# Patient Record
Sex: MALE | Race: WHITE | NOT HISPANIC OR LATINO | URBAN - METROPOLITAN AREA
[De-identification: names, ages, dates, MRNs, and addresses within clinical notes are randomized per-mention and may not be internally consistent; named-entity substitution may affect disease eponyms.]

---

## 2018-03-14 ENCOUNTER — OFFICE (OUTPATIENT)
Dept: URBAN - METROPOLITAN AREA CLINIC 45 | Facility: CLINIC | Age: 68
End: 2018-03-14

## 2018-03-14 VITALS
SYSTOLIC BLOOD PRESSURE: 142 MMHG | HEART RATE: 80 BPM | DIASTOLIC BLOOD PRESSURE: 78 MMHG | HEIGHT: 65 IN | WEIGHT: 197 LBS

## 2018-03-14 DIAGNOSIS — E66.9 OBESITY: ICD-10-CM

## 2018-03-14 DIAGNOSIS — I85.00: ICD-10-CM

## 2018-03-14 DIAGNOSIS — E11.9 DIABETES MELLITUS - NON ID, UNSPECIFIED: ICD-10-CM

## 2018-03-14 DIAGNOSIS — I67.89: ICD-10-CM

## 2018-03-14 PROCEDURE — 99215 OFFICE O/P EST HI 40 MIN: CPT | Performed by: INTERNAL MEDICINE

## 2018-03-14 RX ORDER — PROPRANOLOL HYDROCHLORIDE 40 MG/1
TABLET ORAL
Qty: 60 | Status: ACTIVE
Start: 2015-01-17 | End: 2018-09-06

## 2018-03-14 NOTE — SERVICEHPINOTES
The patient returns for follow-up of chronic liver disease related to prior history of having alcohol abuse and probable MANN. The patient has history of decompensated cirrhosis with signs of portal hypertension and hepatic encephalopathy.  He has not had ascites yet, although had evidence of grade 2 non-bleeding esophageal varices on initial upper endoscopy done in 2015.  Since last visit the patient has remained stable. The patient's liver disease has been previously complicated by hepatic encephalopathy, that appears to be under good control by twice daily use of Xifaxan. The patient complains of mild chronic fatigue, otherwise is pretty asymptomatic. Liver maintenance issues that need to be updated include discussion about continuous maintenance of soft low salt diet and avoidance of alcohol–hepatotoxic medication. We will continue screening for hepatocellular carcinoma with imaging study of the liver and alpha-fetoprotein levels every 6 month. The patient is also due for endoscopic evaluation for varices and primary prevention of variceal bleeding.

## 2018-03-15 LAB
ALPHA FETOPROTEIN, TUMOR MARKER: 2.1 NG/ML (ref ?–6.1)
AMMONIA (P): 70 UMOL/L — HIGH (ref ?–47)
CBC (INCLUDES DIFF/PLT): ABSOLUTE BAND NEUTROPHILS: NORMAL CELLS/UL
CBC (INCLUDES DIFF/PLT): ABSOLUTE BASOPHILS: 28 CELLS/UL (ref 0–200)
CBC (INCLUDES DIFF/PLT): ABSOLUTE BLASTS: NORMAL CELLS/UL
CBC (INCLUDES DIFF/PLT): ABSOLUTE EOSINOPHILS: 61 CELLS/UL (ref 15–500)
CBC (INCLUDES DIFF/PLT): ABSOLUTE LYMPHOCYTES: 1227 CELLS/UL (ref 850–3900)
CBC (INCLUDES DIFF/PLT): ABSOLUTE METAMYELOCYTES: NORMAL CELLS/UL
CBC (INCLUDES DIFF/PLT): ABSOLUTE MONOCYTES: 465 CELLS/UL (ref 200–950)
CBC (INCLUDES DIFF/PLT): ABSOLUTE MYELOCYTES: NORMAL CELLS/UL
CBC (INCLUDES DIFF/PLT): ABSOLUTE NEUTROPHILS: 2919 CELLS/UL (ref 1500–7800)
CBC (INCLUDES DIFF/PLT): ABSOLUTE NUCLEATED RBC: 0 CELLS/UL (ref 0–?)
CBC (INCLUDES DIFF/PLT): ABSOLUTE PROMYELOCYTES: NORMAL CELLS/UL
CBC (INCLUDES DIFF/PLT): BAND NEUTROPHILS: NORMAL %
CBC (INCLUDES DIFF/PLT): BASOPHILS: 0.6 %
CBC (INCLUDES DIFF/PLT): BLASTS: NORMAL %
CBC (INCLUDES DIFF/PLT): COMMENT(S): NORMAL
CBC (INCLUDES DIFF/PLT): EOSINOPHILS: 1.3 %
CBC (INCLUDES DIFF/PLT): HEMATOCRIT: 37.5 % — LOW (ref 38.5–50)
CBC (INCLUDES DIFF/PLT): HEMOGLOBIN: 11.5 G/DL — LOW (ref 13.2–17.1)
CBC (INCLUDES DIFF/PLT): LYMPHOCYTES: 26.1 %
CBC (INCLUDES DIFF/PLT): MCH: 22 PG — LOW (ref 27–33)
CBC (INCLUDES DIFF/PLT): MCHC: 30.7 G/DL — LOW (ref 32–36)
CBC (INCLUDES DIFF/PLT): MCV: 71.7 FL — LOW (ref 80–100)
CBC (INCLUDES DIFF/PLT): METAMYELOCYTES: NORMAL %
CBC (INCLUDES DIFF/PLT): MONOCYTES: 9.9 %
CBC (INCLUDES DIFF/PLT): MPV: (no result) FL
CBC (INCLUDES DIFF/PLT): MYELOCYTES: NORMAL %
CBC (INCLUDES DIFF/PLT): NEUTROPHILS: 62.1 %
CBC (INCLUDES DIFF/PLT): NUCLEATED RBC: NORMAL /100 WBC
CBC (INCLUDES DIFF/PLT): PLATELET COUNT: 133 THOUSAND/UL — LOW (ref 140–400)
CBC (INCLUDES DIFF/PLT): PROMYELOCYTES: NORMAL %
CBC (INCLUDES DIFF/PLT): RDW: 17.5 % — HIGH (ref 11–15)
CBC (INCLUDES DIFF/PLT): REACTIVE LYMPHOCYTES: NORMAL %
CBC (INCLUDES DIFF/PLT): RED BLOOD CELL COUNT: 5.23 MILLION/UL (ref 4.2–5.8)
CBC (INCLUDES DIFF/PLT): WHITE BLOOD CELL COUNT: 4.7 THOUSAND/UL (ref 3.8–10.8)
COMPREHENSIVE METABOLIC PANEL: ALBUMIN/GLOBULIN RATIO: 1.3 (CALC) (ref 1–2.5)
COMPREHENSIVE METABOLIC PANEL: ALBUMIN: 4.5 G/DL (ref 3.6–5.1)
COMPREHENSIVE METABOLIC PANEL: ALKALINE PHOSPHATASE: 52 U/L (ref 40–115)
COMPREHENSIVE METABOLIC PANEL: ALT: 46 U/L (ref 9–46)
COMPREHENSIVE METABOLIC PANEL: AST: 39 U/L — HIGH (ref 10–35)
COMPREHENSIVE METABOLIC PANEL: BILIRUBIN, TOTAL: 0.7 MG/DL (ref 0.2–1.2)
COMPREHENSIVE METABOLIC PANEL: BUN/CREATININE RATIO: (no result) (CALC)
COMPREHENSIVE METABOLIC PANEL: CALCIUM: 9.5 MG/DL (ref 8.6–10.3)
COMPREHENSIVE METABOLIC PANEL: CARBON DIOXIDE: 25 MMOL/L (ref 20–31)
COMPREHENSIVE METABOLIC PANEL: CHLORIDE: 101 MMOL/L (ref 98–110)
COMPREHENSIVE METABOLIC PANEL: CREATININE: 1.16 MG/DL (ref 0.7–1.25)
COMPREHENSIVE METABOLIC PANEL: EGFR AFRICAN AMERICAN: 74 ML/MIN/1.73M2 (ref 60–?)
COMPREHENSIVE METABOLIC PANEL: EGFR NON-AFR. AMERICAN: 64 ML/MIN/1.73M2 (ref 60–?)
COMPREHENSIVE METABOLIC PANEL: GLOBULIN: 3.6 G/DL (CALC) (ref 1.9–3.7)
COMPREHENSIVE METABOLIC PANEL: GLUCOSE: 284 MG/DL — HIGH (ref 65–139)
COMPREHENSIVE METABOLIC PANEL: POTASSIUM: 4.2 MMOL/L (ref 3.5–5.3)
COMPREHENSIVE METABOLIC PANEL: PROTEIN, TOTAL: 8.1 G/DL (ref 6.1–8.1)
COMPREHENSIVE METABOLIC PANEL: SODIUM: 134 MMOL/L — LOW (ref 135–146)
COMPREHENSIVE METABOLIC PANEL: UREA NITROGEN (BUN): 15 MG/DL (ref 7–25)
HEPATITIS A AB, TOTAL: REACTIVE
HEPATITIS B SURFACE ANTIBODY QL: NORMAL
HEPATITIS PANEL, ACUTE W/REFLEX TO CONFIRMATION: CONFIRMATION: NORMAL
HEPATITIS PANEL, ACUTE W/REFLEX TO CONFIRMATION: HEPATITIS A IGM: NORMAL
HEPATITIS PANEL, ACUTE W/REFLEX TO CONFIRMATION: HEPATITIS B CORE ANTIBODY (IGM): NORMAL
HEPATITIS PANEL, ACUTE W/REFLEX TO CONFIRMATION: HEPATITIS B SURFACE ANTIGEN: NORMAL
HEPATITIS PANEL, ACUTE W/REFLEX TO CONFIRMATION: HEPATITIS C ANTIBODY: NORMAL
HEPATITIS PANEL, ACUTE W/REFLEX TO CONFIRMATION: SIGNAL TO CUT-OFF: 0.02 (ref ?–1)
PROTHROMBIN TIME-INR: INR: 1.1
PROTHROMBIN TIME-INR: PT: 12 SEC — HIGH (ref 9–11.5)

## 2018-03-20 LAB — MR ABDOMEN W WO: PDF REPORT: (no result)

## 2018-09-05 ENCOUNTER — OFFICE (OUTPATIENT)
Dept: URBAN - METROPOLITAN AREA CLINIC 45 | Facility: CLINIC | Age: 68
End: 2018-09-05

## 2018-09-05 VITALS
HEIGHT: 65 IN | WEIGHT: 187 LBS | DIASTOLIC BLOOD PRESSURE: 75 MMHG | HEART RATE: 86 BPM | SYSTOLIC BLOOD PRESSURE: 130 MMHG

## 2018-09-05 DIAGNOSIS — K59.1 DIARRHEA, FUNCTIONAL: ICD-10-CM

## 2018-09-05 DIAGNOSIS — R07.9 CHEST PAIN: ICD-10-CM

## 2018-09-05 DIAGNOSIS — K76.6 PORTAL HYPERTENSION: ICD-10-CM

## 2018-09-05 DIAGNOSIS — I85.00: ICD-10-CM

## 2018-09-05 DIAGNOSIS — I67.89: ICD-10-CM

## 2018-09-05 PROCEDURE — 99215 OFFICE O/P EST HI 40 MIN: CPT | Performed by: INTERNAL MEDICINE

## 2018-09-05 NOTE — SERVICEHPINOTES
The patient returns for another 6-month follow-up of chronic liver disease related to prior history of having alcohol abuse and probable MANN. She reports no significant change since last visit, denies worsening of encephalopathy and memory problems. The patient has history of cirrhosis with signs of portal hypertension and hepatic encephalopathy. He has not had ascites yet, although had evidence of grade 2 non-bleeding esophageal varices on initial upper endoscopy done in 2015. The patient complains of mild chronic fatigue with difficulties walking, intermittent chest pains. He is reportedly undergoing cardiac rehabilitation exercises every Friday. Liver maintenance issues that need to be updated include discussion about continuous maintenance of soft low salt diet and avoidance of alcohol–hepatotoxic medication. We will continue screening for hepatocellular carcinoma with imaging study of the liver and alpha-fetoprotein levels every 6 month. The patient is also due for endoscopic evaluation for varices and primary prevention of variceal bleeding.  The patient reports compliance with all his medication, including witnessed twice daily propranolol, however, his heart rate continues to be in mid 80s with systolic blood pressure around 140.

## 2018-09-06 LAB
ALPHA FETOPROTEIN, TUMOR MARKER: 1.6 NG/ML (ref ?–6.1)
AMMONIA (P): 49 UMOL/L — HIGH (ref ?–47)
CBC (H/H, RBC, INDICES, WBC, PLT): HEMATOCRIT: 29.8 % — LOW (ref 38.5–50)
CBC (H/H, RBC, INDICES, WBC, PLT): HEMOGLOBIN: 8.5 G/DL — LOW (ref 13.2–17.1)
CBC (H/H, RBC, INDICES, WBC, PLT): MCH: 18.6 PG — LOW (ref 27–33)
CBC (H/H, RBC, INDICES, WBC, PLT): MCHC: 28.5 G/DL — LOW (ref 32–36)
CBC (H/H, RBC, INDICES, WBC, PLT): MCV: 65.1 FL — LOW (ref 80–100)
CBC (H/H, RBC, INDICES, WBC, PLT): MPV: 10.6 FL (ref 7.5–12.5)
CBC (H/H, RBC, INDICES, WBC, PLT): PLATELET COUNT: 171 THOUSAND/UL (ref 140–400)
CBC (H/H, RBC, INDICES, WBC, PLT): RDW: 18.5 % — HIGH (ref 11–15)
CBC (H/H, RBC, INDICES, WBC, PLT): RED BLOOD CELL COUNT: 4.58 MILLION/UL (ref 4.2–5.8)
CBC (H/H, RBC, INDICES, WBC, PLT): WHITE BLOOD CELL COUNT: 5.8 THOUSAND/UL (ref 3.8–10.8)
COMPREHENSIVE METABOLIC PANEL: ALBUMIN/GLOBULIN RATIO: 1.3 (CALC) (ref 1–2.5)
COMPREHENSIVE METABOLIC PANEL: ALBUMIN: 4.5 G/DL (ref 3.6–5.1)
COMPREHENSIVE METABOLIC PANEL: ALKALINE PHOSPHATASE: 40 U/L (ref 40–115)
COMPREHENSIVE METABOLIC PANEL: ALT: 25 U/L (ref 9–46)
COMPREHENSIVE METABOLIC PANEL: AST: 28 U/L (ref 10–35)
COMPREHENSIVE METABOLIC PANEL: BILIRUBIN, TOTAL: 0.6 MG/DL (ref 0.2–1.2)
COMPREHENSIVE METABOLIC PANEL: BUN/CREATININE RATIO: 17 (CALC) (ref 6–22)
COMPREHENSIVE METABOLIC PANEL: CALCIUM: 9.6 MG/DL (ref 8.6–10.3)
COMPREHENSIVE METABOLIC PANEL: CARBON DIOXIDE: 20 MMOL/L (ref 20–32)
COMPREHENSIVE METABOLIC PANEL: CHLORIDE: 104 MMOL/L (ref 98–110)
COMPREHENSIVE METABOLIC PANEL: CREATININE: 1.34 MG/DL — HIGH (ref 0.7–1.25)
COMPREHENSIVE METABOLIC PANEL: EGFR AFRICAN AMERICAN: 62 ML/MIN/1.73M2 (ref 60–?)
COMPREHENSIVE METABOLIC PANEL: EGFR NON-AFR. AMERICAN: 54 ML/MIN/1.73M2 — LOW (ref 60–?)
COMPREHENSIVE METABOLIC PANEL: GLOBULIN: 3.4 G/DL (CALC) (ref 1.9–3.7)
COMPREHENSIVE METABOLIC PANEL: GLUCOSE: 160 MG/DL — HIGH (ref 65–139)
COMPREHENSIVE METABOLIC PANEL: POTASSIUM: 4 MMOL/L (ref 3.5–5.3)
COMPREHENSIVE METABOLIC PANEL: PROTEIN, TOTAL: 7.9 G/DL (ref 6.1–8.1)
COMPREHENSIVE METABOLIC PANEL: SODIUM: 136 MMOL/L (ref 135–146)
COMPREHENSIVE METABOLIC PANEL: UREA NITROGEN (BUN): 23 MG/DL (ref 7–25)

## 2018-09-06 RX ORDER — PROPRANOLOL HYDROCHLORIDE 40 MG/1
TABLET ORAL
Qty: 60 | Status: ACTIVE
Start: 2015-01-17 | End: 2018-09-06

## 2019-05-01 ENCOUNTER — OFFICE (OUTPATIENT)
Dept: URBAN - METROPOLITAN AREA CLINIC 45 | Facility: CLINIC | Age: 69
End: 2019-05-01

## 2019-05-01 VITALS
HEART RATE: 79 BPM | SYSTOLIC BLOOD PRESSURE: 151 MMHG | WEIGHT: 191 LBS | DIASTOLIC BLOOD PRESSURE: 88 MMHG | HEIGHT: 65 IN

## 2019-05-01 DIAGNOSIS — K70.30 CIRRHOSIS, ALCOHOLIC: ICD-10-CM

## 2019-05-01 DIAGNOSIS — I67.89: ICD-10-CM

## 2019-05-01 DIAGNOSIS — D64.9 ANEMIA UNSPECIFIED: ICD-10-CM

## 2019-05-01 DIAGNOSIS — I85.00: ICD-10-CM

## 2019-05-01 DIAGNOSIS — E11.9 DIABETES MELLITUS - NON ID, UNSPECIFIED: ICD-10-CM

## 2019-05-01 PROCEDURE — 99215 OFFICE O/P EST HI 40 MIN: CPT | Performed by: INTERNAL MEDICINE

## 2019-05-01 NOTE — SERVICEHPINOTES
The patient returns for routine 6-month follow-up of chronic liver disease/compensated cirrhosis. Patient reports increase in abdominal girth without abdominal pain or discomfort. He also denies weight gain or edema, reports no significant clinical change since last visit. He denies worsening of encephalopathy while taking twice-daily Xifaxan. The patient has known cirrhosis with portal hypertension and mild hepatic encephalopathy. He has not had ascites, although grade 2 non-bleeding esophageal varices were noted on initial upper endoscopy done by me in 2015. The patient complains of mild chronic fatigue with difficulties walking. Liver maintenance issues that need to be updated include continuous maintenance of soft low salt diet, avoidance of alcohol and hepatotoxic medication. We will continue screening for hepatocellular carcinoma with imaging study of the liver and alpha-fetoprotein levels every 6 month. The patient is also due for endoscopic evaluation for varices and primary prevention of variceal bleeding if cleared by cardiologist.

## 2019-05-02 LAB
ALPHA FETOPROTEIN, TUMOR MARKER: 1.8 NG/ML (ref ?–6.1)
CBC (H/H, RBC, INDICES, WBC, PLT): HEMATOCRIT: 36.5 % — LOW (ref 38.5–50)
CBC (H/H, RBC, INDICES, WBC, PLT): HEMOGLOBIN: 11.7 G/DL — LOW (ref 13.2–17.1)
CBC (H/H, RBC, INDICES, WBC, PLT): MCH: 25 PG — LOW (ref 27–33)
CBC (H/H, RBC, INDICES, WBC, PLT): MCHC: 32.1 G/DL (ref 32–36)
CBC (H/H, RBC, INDICES, WBC, PLT): MCV: 78 FL — LOW (ref 80–100)
CBC (H/H, RBC, INDICES, WBC, PLT): MPV: 12.2 FL (ref 7.5–12.5)
CBC (H/H, RBC, INDICES, WBC, PLT): PLATELET COUNT: 141 THOUSAND/UL (ref 140–400)
CBC (H/H, RBC, INDICES, WBC, PLT): RDW: 15.6 % — HIGH (ref 11–15)
CBC (H/H, RBC, INDICES, WBC, PLT): RED BLOOD CELL COUNT: 4.68 MILLION/UL (ref 4.2–5.8)
CBC (H/H, RBC, INDICES, WBC, PLT): WHITE BLOOD CELL COUNT: 5.5 THOUSAND/UL (ref 3.8–10.8)
COMPREHENSIVE METABOLIC PANEL: ALBUMIN/GLOBULIN RATIO: 1.3 (CALC) (ref 1–2.5)
COMPREHENSIVE METABOLIC PANEL: ALBUMIN: 4.5 G/DL (ref 3.6–5.1)
COMPREHENSIVE METABOLIC PANEL: ALKALINE PHOSPHATASE: 49 U/L (ref 40–115)
COMPREHENSIVE METABOLIC PANEL: ALT: 33 U/L (ref 9–46)
COMPREHENSIVE METABOLIC PANEL: AST: 33 U/L (ref 10–35)
COMPREHENSIVE METABOLIC PANEL: BILIRUBIN, TOTAL: 0.7 MG/DL (ref 0.2–1.2)
COMPREHENSIVE METABOLIC PANEL: BUN/CREATININE RATIO: (no result) (CALC)
COMPREHENSIVE METABOLIC PANEL: CALCIUM: 9.7 MG/DL (ref 8.6–10.3)
COMPREHENSIVE METABOLIC PANEL: CARBON DIOXIDE: 23 MMOL/L (ref 20–32)
COMPREHENSIVE METABOLIC PANEL: CHLORIDE: 100 MMOL/L (ref 98–110)
COMPREHENSIVE METABOLIC PANEL: CREATININE: 1.07 MG/DL (ref 0.7–1.18)
COMPREHENSIVE METABOLIC PANEL: EGFR AFRICAN AMERICAN: 81 ML/MIN/1.73M2 (ref 60–?)
COMPREHENSIVE METABOLIC PANEL: EGFR NON-AFR. AMERICAN: 70 ML/MIN/1.73M2 (ref 60–?)
COMPREHENSIVE METABOLIC PANEL: GLOBULIN: 3.5 G/DL (CALC) (ref 1.9–3.7)
COMPREHENSIVE METABOLIC PANEL: GLUCOSE: 229 MG/DL — HIGH (ref 65–139)
COMPREHENSIVE METABOLIC PANEL: POTASSIUM: 4.2 MMOL/L (ref 3.5–5.3)
COMPREHENSIVE METABOLIC PANEL: PROTEIN, TOTAL: 8 G/DL (ref 6.1–8.1)
COMPREHENSIVE METABOLIC PANEL: SODIUM: 135 MMOL/L (ref 135–146)
COMPREHENSIVE METABOLIC PANEL: UREA NITROGEN (BUN): 18 MG/DL (ref 7–25)
PROTHROMBIN TIME-INR: INR: 1.1
PROTHROMBIN TIME-INR: PT: 11.8 SEC — HIGH (ref 9–11.5)

## 2019-08-20 ENCOUNTER — OFFICE (OUTPATIENT)
Dept: URBAN - METROPOLITAN AREA CLINIC 45 | Facility: CLINIC | Age: 69
End: 2019-08-20

## 2019-08-20 VITALS
SYSTOLIC BLOOD PRESSURE: 136 MMHG | DIASTOLIC BLOOD PRESSURE: 83 MMHG | WEIGHT: 187 LBS | HEART RATE: 84 BPM | HEIGHT: 65 IN

## 2019-08-20 DIAGNOSIS — E11.9 DIABETES MELLITUS - NON ID, UNSPECIFIED: ICD-10-CM

## 2019-08-20 DIAGNOSIS — I85.00: ICD-10-CM

## 2019-08-20 DIAGNOSIS — D64.9: ICD-10-CM

## 2019-08-20 DIAGNOSIS — E66.9 OBESITY: ICD-10-CM

## 2019-08-20 DIAGNOSIS — I67.89: ICD-10-CM

## 2019-08-20 DIAGNOSIS — K70.30 CIRRHOSIS, ALCOHOLIC: ICD-10-CM

## 2019-08-20 PROCEDURE — 99215 OFFICE O/P EST HI 40 MIN: CPT | Performed by: INTERNAL MEDICINE

## 2019-08-20 RX ORDER — FERROUS SULFATE 325(65) MG
TABLET ORAL
Qty: 0 | Status: COMPLETED
End: 2024-03-14

## 2019-08-20 NOTE — SERVICEHPINOTES
The patient for evaluation of recent drop in H&ampH noted on routine lab work performed by patient's PCP.  He was found to have hemoglobin drop to 9.8 from a fairly recent baseline of about 11.8. Patient has been routinely followed every 6-month for chronic liver disease with suspected compensated cirrhosis. He denies recent melena or hematochezia, meaningful weight gain or edema, overall reports no significant clinical change since last visit. He denies worsening of previously experienced hepatic encephalopathy while on twice-daily Xifaxan. The patient has previously been diagnosed with compensated cirrhosis and had evidence of grade 2 non-bleeding esophageal varices on upper endoscopy done by me in 2016. He at the time had unremarkable colonoscopy except internal hemorrhoids. The patient complains of mild chronic fatigue with difficulties walking. The patient was already recommended to undergo for endoscopic evaluation for primary prevention of variceal bleeding. Patient wasn't yet cleared by cardiologist due to remote history of cardiac event and coronary angiography with placement of drug-eluting stent.

## 2019-08-21 LAB
FERRITIN: 9 NG/ML — LOW (ref 24–380)
IRON AND TOTAL IRON BINDING CAPACITY: % SATURATION: 6 % (CALC) — LOW (ref 20–48)
IRON AND TOTAL IRON BINDING CAPACITY: IRON BINDING CAPACITY: 539 MCG/DL (CALC) — HIGH (ref 250–425)
IRON AND TOTAL IRON BINDING CAPACITY: IRON, TOTAL: 31 MCG/DL — LOW (ref 50–180)
VITAMIN B12: 305 PG/ML (ref 200–1100)

## 2019-10-07 ENCOUNTER — AMBULATORY SURGICAL CENTER (OUTPATIENT)
Dept: URBAN - METROPOLITAN AREA SURGERY 28 | Facility: SURGERY | Age: 69
End: 2019-10-07

## 2019-10-07 VITALS
OXYGEN SATURATION: 98 % | RESPIRATION RATE: 16 BRPM | SYSTOLIC BLOOD PRESSURE: 182 MMHG | HEIGHT: 65 IN | TEMPERATURE: 97.9 F | WEIGHT: 185 LBS | HEART RATE: 73 BPM | DIASTOLIC BLOOD PRESSURE: 108 MMHG

## 2019-10-07 DIAGNOSIS — K31.7 POLYP OF STOMACH AND DUODENUM: ICD-10-CM

## 2019-10-07 DIAGNOSIS — K31.89 OTHER DISEASES OF STOMACH AND DUODENUM: ICD-10-CM

## 2019-10-07 DIAGNOSIS — D64.9 ANEMIA, UNSPECIFIED: ICD-10-CM

## 2019-10-07 PROBLEM — K29.70 GASTRITIS, UNSPECIFIED, WITHOUT BLEEDING: Status: ACTIVE | Noted: 2019-10-07

## 2019-10-07 LAB — SURGICAL: PDF REPORT: (no result)

## 2019-10-07 PROCEDURE — 43250 EGD CAUTERY TUMOR POLYP: CPT | Performed by: INTERNAL MEDICINE

## 2019-10-07 PROCEDURE — 43239 EGD BIOPSY SINGLE/MULTIPLE: CPT | Mod: 59 | Performed by: INTERNAL MEDICINE

## 2019-10-07 PROCEDURE — 45378 DIAGNOSTIC COLONOSCOPY: CPT | Performed by: INTERNAL MEDICINE

## 2019-12-10 ENCOUNTER — OFFICE (OUTPATIENT)
Dept: URBAN - METROPOLITAN AREA CLINIC 45 | Facility: CLINIC | Age: 69
End: 2019-12-10

## 2019-12-10 VITALS
DIASTOLIC BLOOD PRESSURE: 83 MMHG | WEIGHT: 190 LBS | SYSTOLIC BLOOD PRESSURE: 153 MMHG | HEART RATE: 72 BPM | HEIGHT: 65 IN

## 2019-12-10 DIAGNOSIS — D64.9: ICD-10-CM

## 2019-12-10 DIAGNOSIS — K70.30 CIRRHOSIS, ALCOHOLIC: ICD-10-CM

## 2019-12-10 DIAGNOSIS — I85.00 ESOPHAGEAL VARICES WITHOUT BLEEDING: ICD-10-CM

## 2019-12-10 DIAGNOSIS — R15.9 INCONTINENCE, FECES: ICD-10-CM

## 2019-12-10 DIAGNOSIS — I67.89: ICD-10-CM

## 2019-12-10 DIAGNOSIS — E11.9 DIABETES MELLITUS - NON ID, UNSPECIFIED: ICD-10-CM

## 2019-12-10 DIAGNOSIS — K31.7 POLYP OF STOMACH AND DUODENUM: ICD-10-CM

## 2019-12-10 PROCEDURE — 99214 OFFICE O/P EST MOD 30 MIN: CPT | Performed by: INTERNAL MEDICINE

## 2019-12-10 NOTE — SERVICEHPINOTES
The patient returns following recent EGD and colonoscopy done to evaluate recent hemoglobin drop to 9.8 from a baseline of about 11.8.  EGD revealed grade 2 esophageal varices doubt stigmata of bleeding.  There was a large angry looking hyperplastic polyp in the stomach, which was biopsied. Patient has been routinely followed every 6-month for chronic liver disease with recently compensated cirrhosis. He still denies having overt melena or hematochezia, weight gain or edema, overall reports no significant clinical change since last visit. He now reports episodes of explosive diarrhea with occasional fecal incontinence. He just had unremarkable colonoscopy except internal hemorrhoids partially limited by suboptimal bowel preparation. The patient complains of chronic fatigue with difficulties walking.

## 2020-02-26 ENCOUNTER — OFFICE (OUTPATIENT)
Dept: URBAN - METROPOLITAN AREA CLINIC 45 | Facility: CLINIC | Age: 70
End: 2020-02-26

## 2020-02-26 VITALS
HEIGHT: 65 IN | WEIGHT: 185 LBS | DIASTOLIC BLOOD PRESSURE: 76 MMHG | SYSTOLIC BLOOD PRESSURE: 126 MMHG | HEART RATE: 77 BPM

## 2020-02-26 DIAGNOSIS — K31.7 POLYP OF STOMACH: ICD-10-CM

## 2020-02-26 DIAGNOSIS — R19.5 FECES COLOR: TARRY: ICD-10-CM

## 2020-02-26 DIAGNOSIS — I85.00: ICD-10-CM

## 2020-02-26 DIAGNOSIS — D64.9: ICD-10-CM

## 2020-02-26 DIAGNOSIS — K55.20 ANGIODYSPLASIA OF COLON WITHOUT HEMORRHAGE: ICD-10-CM

## 2020-02-26 PROCEDURE — 99214 OFFICE O/P EST MOD 30 MIN: CPT | Performed by: INTERNAL MEDICINE

## 2020-02-26 NOTE — SERVICEHPINOTES
The patient returns with complaints of recent onset of tarry stools.  He has had black stools off and on for about 10 days. Meanwhile, patient denies abdominal pain or change in bowel habits, dizziness or lightheadedness. EGD and colonoscopy were performed by me in October 2019 to evaluate prior hemoglobin drop to 9.8 from a baseline of about 11.8. EGD revealed grade 2 esophageal varices stigmata of bleeding. There was about 1.5-1.8 cm angry-looking polyp in the stomach, which was not removed, but biopsied.  Another smaller 1 cm polyp was removed by snare polypectomy showing benign histology. Patient has been routinely followed every 6-month for chronic liver disease with recently compensated cirrhosis. He denies having overt hematochezia, weight gain or edema, and reports no significant clinical change since last visit.

## 2020-02-28 LAB
CBC (H/H, RBC, INDICES, WBC, PLT): HEMATOCRIT: 39.1 % (ref 38.5–50)
CBC (H/H, RBC, INDICES, WBC, PLT): HEMOGLOBIN: 13.1 G/DL — LOW (ref 13.2–17.1)
CBC (H/H, RBC, INDICES, WBC, PLT): MCH: 26.4 PG — LOW (ref 27–33)
CBC (H/H, RBC, INDICES, WBC, PLT): MCHC: 33.5 G/DL (ref 32–36)
CBC (H/H, RBC, INDICES, WBC, PLT): MCV: 78.8 FL — LOW (ref 80–100)
CBC (H/H, RBC, INDICES, WBC, PLT): MPV: 10.9 FL (ref 7.5–12.5)
CBC (H/H, RBC, INDICES, WBC, PLT): PLATELET COUNT: 169 THOUSAND/UL (ref 140–400)
CBC (H/H, RBC, INDICES, WBC, PLT): RDW: 14.8 % (ref 11–15)
CBC (H/H, RBC, INDICES, WBC, PLT): RED BLOOD CELL COUNT: 4.96 MILLION/UL (ref 4.2–5.8)
CBC (H/H, RBC, INDICES, WBC, PLT): WHITE BLOOD CELL COUNT: 7.6 THOUSAND/UL (ref 3.8–10.8)
COMPREHENSIVE METABOLIC PANEL: ALBUMIN/GLOBULIN RATIO: 1.2 (CALC) (ref 1–2.5)
COMPREHENSIVE METABOLIC PANEL: ALBUMIN: 4.4 G/DL (ref 3.6–5.1)
COMPREHENSIVE METABOLIC PANEL: ALKALINE PHOSPHATASE: 59 U/L (ref 35–144)
COMPREHENSIVE METABOLIC PANEL: ALT: 43 U/L (ref 9–46)
COMPREHENSIVE METABOLIC PANEL: AST: 40 U/L — HIGH (ref 10–35)
COMPREHENSIVE METABOLIC PANEL: BILIRUBIN, TOTAL: 0.8 MG/DL (ref 0.2–1.2)
COMPREHENSIVE METABOLIC PANEL: BUN/CREATININE RATIO: 18 (CALC) (ref 6–22)
COMPREHENSIVE METABOLIC PANEL: CALCIUM: 10.4 MG/DL — HIGH (ref 8.6–10.3)
COMPREHENSIVE METABOLIC PANEL: CARBON DIOXIDE: 26 MMOL/L (ref 20–32)
COMPREHENSIVE METABOLIC PANEL: CHLORIDE: 103 MMOL/L (ref 98–110)
COMPREHENSIVE METABOLIC PANEL: CREATININE: 1.37 MG/DL — HIGH (ref 0.7–1.18)
COMPREHENSIVE METABOLIC PANEL: EGFR AFRICAN AMERICAN: 60 ML/MIN/1.73M2 (ref 60–?)
COMPREHENSIVE METABOLIC PANEL: EGFR NON-AFR. AMERICAN: 52 ML/MIN/1.73M2 — LOW (ref 60–?)
COMPREHENSIVE METABOLIC PANEL: GLOBULIN: 3.6 G/DL (CALC) (ref 1.9–3.7)
COMPREHENSIVE METABOLIC PANEL: GLUCOSE: 69 MG/DL (ref 65–139)
COMPREHENSIVE METABOLIC PANEL: POTASSIUM: 4.7 MMOL/L (ref 3.5–5.3)
COMPREHENSIVE METABOLIC PANEL: PROTEIN, TOTAL: 8 G/DL (ref 6.1–8.1)
COMPREHENSIVE METABOLIC PANEL: SODIUM: 139 MMOL/L (ref 135–146)
COMPREHENSIVE METABOLIC PANEL: UREA NITROGEN (BUN): 24 MG/DL (ref 7–25)
FERRITIN: 51 NG/ML (ref 24–380)
IRON AND TOTAL IRON BINDING CAPACITY: % SATURATION: 22 % (CALC) (ref 20–48)
IRON AND TOTAL IRON BINDING CAPACITY: IRON BINDING CAPACITY: 457 MCG/DL (CALC) — HIGH (ref 250–425)
IRON AND TOTAL IRON BINDING CAPACITY: IRON, TOTAL: 99 MCG/DL (ref 50–180)
PROTHROMBIN TIME-INR: INR: 1.1
PROTHROMBIN TIME-INR: PT: 11.4 SEC (ref 9–11.5)
VITAMIN B12: 418 PG/ML (ref 200–1100)

## 2020-05-21 ENCOUNTER — OFFICE (OUTPATIENT)
Dept: URBAN - METROPOLITAN AREA CLINIC 45 | Facility: CLINIC | Age: 70
End: 2020-05-21

## 2020-05-21 VITALS — HEIGHT: 65 IN | WEIGHT: 189 LBS

## 2020-05-21 DIAGNOSIS — D64.9: ICD-10-CM

## 2020-05-21 DIAGNOSIS — E11.9 DIABETES MELLITUS - NON ID, UNSPECIFIED: ICD-10-CM

## 2020-05-21 DIAGNOSIS — I85.00 ESOPHAGEAL VARICES WITHOUT BLEEDING: ICD-10-CM

## 2020-05-21 DIAGNOSIS — I67.89: ICD-10-CM

## 2020-05-21 DIAGNOSIS — K31.7 POLYP OF STOMACH: ICD-10-CM

## 2020-05-21 DIAGNOSIS — E66.9 OBESITY: ICD-10-CM

## 2020-05-21 PROCEDURE — 99213 OFFICE O/P EST LOW 20 MIN: CPT | Performed by: INTERNAL MEDICINE

## 2020-05-21 PROCEDURE — G0406 INPT/TELE FOLLOW UP 15: HCPCS | Performed by: INTERNAL MEDICINE

## 2020-05-21 NOTE — SERVICEHPINOTES
The patient returns for telemedicine follow-up provided by telephone visit since patient wasn't able to connect to the video. He denies having black stools, hematochezia, abdominal pain or change in bowel habits, dizziness or lightheadedness. EGD and colonoscopy were last performed by me in October 2019 to evaluate prior hemoglobin drop to 9.8 from a baseline of about 11.8. EGD revealed grade 2 esophageal varices without stigmata of bleeding. There was 1.6-1.8 cm angry-looking sessile polyp present in the body of the stomach, which was not removed, but biopsied. Another sessile 1 cm polyp was removed by snare polypectomy, later showing benign histology. Patient has been routinely followed every 6-month for chronic liver disease with recently compensated cirrhosis. He denies recent increase in abdominal circumference, weight gain or leg edema, and reports no significant clinical change since last visit.

## 2021-05-11 ENCOUNTER — OFFICE (OUTPATIENT)
Dept: URBAN - METROPOLITAN AREA CLINIC 45 | Facility: CLINIC | Age: 71
End: 2021-05-11

## 2021-05-11 VITALS — HEIGHT: 65 IN

## 2021-05-11 DIAGNOSIS — M54.16: ICD-10-CM

## 2021-05-11 DIAGNOSIS — K72.90 HEPATIC ENCEPHALOPATHY: ICD-10-CM

## 2021-05-11 DIAGNOSIS — E11.9 DIABETES MELLITUS - NON ID, UNSPECIFIED: ICD-10-CM

## 2021-05-11 DIAGNOSIS — I85.00: ICD-10-CM

## 2021-05-11 DIAGNOSIS — I67.89: ICD-10-CM

## 2021-05-11 DIAGNOSIS — D64.9: ICD-10-CM

## 2021-05-11 PROCEDURE — G0406 INPT/TELE FOLLOW UP 15: HCPCS | Performed by: INTERNAL MEDICINE

## 2021-05-11 PROCEDURE — 99215 OFFICE O/P EST HI 40 MIN: CPT | Performed by: INTERNAL MEDICINE

## 2021-05-11 NOTE — SERVICEHPINOTES
The patient returns for telemedicine follow-up provided by telephone since patient wasn't able to connect via a Smart ilia to the video. He underwent uneventful 3–4 laminectomy in April of this year for leg radiculopathy, but still experiences difficulty walking. Patient denies having recent symptoms of hepatic encephalopathy, melena, hematochezia, abdominal distention, pain, or lightheadedness. He thinks his preoperative lab work was stable without significant anemia or liver test abnormalities. EGD and colonoscopy were last performed by me in October 2019 to evaluate prior hemoglobin drop to 9.8 from a baseline of about 11.8. EGD revealed grade 1-2 esophageal varices without stigmata of bleeding. There was a large benign yet angry-looking sessile polyp present in the body of the stomach, which was biopsied. Another sessile 1 cm polyp was removed by snare polypectomy, also showing benign hyperplastic histology. Patient was recommended to be routinely followed every 6-months for HCC surveillance due to chronic liver disease/compensated cirrhosis. Most recent recommended surveillance about 6 months ago was delayed by the patient due to ongoing coronavirus pandemic. He denies recent increase in abdominal circumference, weight gain or leg edema, and reports no significant clinical change since last visit.

## 2022-03-13 ENCOUNTER — HOSPITAL ENCOUNTER (INPATIENT)
Dept: HOSPITAL 54 - ER | Age: 72
LOS: 2 days | Discharge: HOME | DRG: 302 | End: 2022-03-15
Attending: INTERNAL MEDICINE | Admitting: NURSE PRACTITIONER
Payer: MEDICARE

## 2022-03-13 VITALS — BODY MASS INDEX: 27.13 KG/M2 | WEIGHT: 179 LBS | HEIGHT: 68 IN

## 2022-03-13 VITALS — DIASTOLIC BLOOD PRESSURE: 87 MMHG | SYSTOLIC BLOOD PRESSURE: 146 MMHG

## 2022-03-13 DIAGNOSIS — I25.119: Primary | ICD-10-CM

## 2022-03-13 DIAGNOSIS — Z95.5: ICD-10-CM

## 2022-03-13 DIAGNOSIS — D64.9: ICD-10-CM

## 2022-03-13 DIAGNOSIS — Z20.822: ICD-10-CM

## 2022-03-13 DIAGNOSIS — Z87.891: ICD-10-CM

## 2022-03-13 DIAGNOSIS — R79.89: ICD-10-CM

## 2022-03-13 DIAGNOSIS — K76.6: ICD-10-CM

## 2022-03-13 DIAGNOSIS — J18.9: ICD-10-CM

## 2022-03-13 DIAGNOSIS — K21.9: ICD-10-CM

## 2022-03-13 DIAGNOSIS — I10: ICD-10-CM

## 2022-03-13 DIAGNOSIS — R59.1: ICD-10-CM

## 2022-03-13 DIAGNOSIS — E11.51: ICD-10-CM

## 2022-03-13 DIAGNOSIS — Z86.73: ICD-10-CM

## 2022-03-13 LAB
BASOPHILS # BLD AUTO: 0 K/UL (ref 0–0.2)
BASOPHILS NFR BLD AUTO: 0.4 % (ref 0–2)
BUN SERPL-MCNC: 26 MG/DL (ref 7–18)
CALCIUM SERPL-MCNC: 9.1 MG/DL (ref 8.5–10.1)
CHLORIDE SERPL-SCNC: 102 MMOL/L (ref 98–107)
CO2 SERPL-SCNC: 19 MMOL/L (ref 21–32)
CREAT SERPL-MCNC: 1.2 MG/DL (ref 0.6–1.3)
EOSINOPHIL NFR BLD AUTO: 2.7 % (ref 0–6)
GLUCOSE SERPL-MCNC: 212 MG/DL (ref 74–106)
HCT VFR BLD AUTO: 40 % (ref 39–51)
HGB BLD-MCNC: 13.2 G/DL (ref 13.5–17.5)
LYMPHOCYTES NFR BLD AUTO: 0.7 K/UL (ref 0.8–4.8)
LYMPHOCYTES NFR BLD AUTO: 14.5 % (ref 20–44)
MCHC RBC AUTO-ENTMCNC: 33 G/DL (ref 31–36)
MCV RBC AUTO: 80 FL (ref 80–96)
MONOCYTES NFR BLD AUTO: 0.5 K/UL (ref 0.1–1.3)
MONOCYTES NFR BLD AUTO: 11.2 % (ref 2–12)
NEUTROPHILS # BLD AUTO: 3.5 K/UL (ref 1.8–8.9)
NEUTROPHILS NFR BLD AUTO: 71.2 % (ref 43–81)
PLATELET # BLD AUTO: 116 K/UL (ref 150–450)
POTASSIUM SERPL-SCNC: 4 MMOL/L (ref 3.5–5.1)
RBC # BLD AUTO: 4.95 MIL/UL (ref 4.5–6)
SODIUM SERPL-SCNC: 136 MMOL/L (ref 136–145)
WBC NRBC COR # BLD AUTO: 4.9 K/UL (ref 4.3–11)

## 2022-03-13 PROCEDURE — G0378 HOSPITAL OBSERVATION PER HR: HCPCS

## 2022-03-14 VITALS — DIASTOLIC BLOOD PRESSURE: 87 MMHG | SYSTOLIC BLOOD PRESSURE: 156 MMHG

## 2022-03-14 VITALS — SYSTOLIC BLOOD PRESSURE: 143 MMHG | DIASTOLIC BLOOD PRESSURE: 82 MMHG

## 2022-03-14 VITALS — SYSTOLIC BLOOD PRESSURE: 147 MMHG | DIASTOLIC BLOOD PRESSURE: 77 MMHG

## 2022-03-14 VITALS — DIASTOLIC BLOOD PRESSURE: 79 MMHG | SYSTOLIC BLOOD PRESSURE: 150 MMHG

## 2022-03-14 VITALS — SYSTOLIC BLOOD PRESSURE: 142 MMHG | DIASTOLIC BLOOD PRESSURE: 75 MMHG

## 2022-03-14 VITALS — DIASTOLIC BLOOD PRESSURE: 77 MMHG | SYSTOLIC BLOOD PRESSURE: 136 MMHG

## 2022-03-14 LAB
BASOPHILS # BLD AUTO: 0 K/UL (ref 0–0.2)
BASOPHILS NFR BLD AUTO: 0.6 % (ref 0–2)
BUN SERPL-MCNC: 22 MG/DL (ref 7–18)
CALCIUM SERPL-MCNC: 8.7 MG/DL (ref 8.5–10.1)
CHLORIDE SERPL-SCNC: 102 MMOL/L (ref 98–107)
CHOLEST SERPL-MCNC: 190 MG/DL (ref ?–200)
CO2 SERPL-SCNC: 23 MMOL/L (ref 21–32)
CREAT SERPL-MCNC: 1.1 MG/DL (ref 0.6–1.3)
EOSINOPHIL NFR BLD AUTO: 2.1 % (ref 0–6)
GLUCOSE SERPL-MCNC: 152 MG/DL (ref 74–106)
HCT VFR BLD AUTO: 38 % (ref 39–51)
HDLC SERPL-MCNC: 18 MG/DL (ref 40–60)
HGB BLD-MCNC: 12.5 G/DL (ref 13.5–17.5)
IRON SERPL-MCNC: 38 UG/DL (ref 50–175)
LDLC SERPL DIRECT ASSAY-MCNC: 47 MG/DL (ref 0–99)
LYMPHOCYTES NFR BLD AUTO: 0.8 K/UL (ref 0.8–4.8)
LYMPHOCYTES NFR BLD AUTO: 21 % (ref 20–44)
MAGNESIUM SERPL-MCNC: 1.9 MG/DL (ref 1.8–2.4)
MCHC RBC AUTO-ENTMCNC: 33 G/DL (ref 31–36)
MCV RBC AUTO: 80 FL (ref 80–96)
MONOCYTES NFR BLD AUTO: 0.5 K/UL (ref 0.1–1.3)
MONOCYTES NFR BLD AUTO: 11.9 % (ref 2–12)
NEUTROPHILS # BLD AUTO: 2.5 K/UL (ref 1.8–8.9)
NEUTROPHILS NFR BLD AUTO: 64.4 % (ref 43–81)
PLATELET # BLD AUTO: 106 K/UL (ref 150–450)
POTASSIUM SERPL-SCNC: 3.9 MMOL/L (ref 3.5–5.1)
RBC # BLD AUTO: 4.7 MIL/UL (ref 4.5–6)
SODIUM SERPL-SCNC: 135 MMOL/L (ref 136–145)
TIBC SERPL-MCNC: 378 UG/DL (ref 250–450)
TRIGL SERPL-MCNC: 858 MG/DL (ref 30–150)
WBC NRBC COR # BLD AUTO: 3.8 K/UL (ref 4.3–11)

## 2022-03-14 RX ADMIN — PREDNISOLONE ACETATE SCH ML: 10 SUSPENSION/ DROPS OPHTHALMIC at 16:19

## 2022-03-14 RX ADMIN — Medication SCH EACH: at 21:17

## 2022-03-14 RX ADMIN — INSULIN HUMAN PRN UNITS: 100 INJECTION, SOLUTION PARENTERAL at 21:18

## 2022-03-14 RX ADMIN — INSULIN HUMAN PRN UNITS: 100 INJECTION, SOLUTION PARENTERAL at 11:16

## 2022-03-14 RX ADMIN — KETOROLAC TROMETHAMINE SCH ML: 5 SOLUTION/ DROPS OPHTHALMIC at 16:18

## 2022-03-14 RX ADMIN — METOPROLOL TARTRATE SCH MG: 50 TABLET, FILM COATED ORAL at 21:11

## 2022-03-14 RX ADMIN — Medication SCH EACH: at 06:46

## 2022-03-14 RX ADMIN — LOSARTAN POTASSIUM SCH MG: 50 TABLET, FILM COATED ORAL at 10:48

## 2022-03-14 RX ADMIN — Medication SCH EACH: at 11:43

## 2022-03-14 RX ADMIN — CLOPIDOGREL BISULFATE SCH MG: 75 TABLET, FILM COATED ORAL at 10:45

## 2022-03-14 RX ADMIN — ASPIRIN 81 MG SCH MG: 81 TABLET ORAL at 10:45

## 2022-03-14 RX ADMIN — GLIMEPIRIDE SCH MG: 4 TABLET ORAL at 10:45

## 2022-03-14 RX ADMIN — Medication SCH EACH: at 16:42

## 2022-03-14 RX ADMIN — PANTOPRAZOLE SODIUM SCH MG: 40 TABLET, DELAYED RELEASE ORAL at 08:27

## 2022-03-14 RX ADMIN — INSULIN HUMAN PRN UNITS: 100 INJECTION, SOLUTION PARENTERAL at 06:47

## 2022-03-14 RX ADMIN — KETOROLAC TROMETHAMINE SCH ML: 5 SOLUTION/ DROPS OPHTHALMIC at 14:03

## 2022-03-14 RX ADMIN — FERROUS SULFATE TAB 325 MG (65 MG ELEMENTAL FE) SCH MG: 325 (65 FE) TAB at 10:45

## 2022-03-14 RX ADMIN — RIFAXIMIN SCH MG: 550 TABLET ORAL at 21:10

## 2022-03-14 RX ADMIN — METOPROLOL TARTRATE SCH MG: 50 TABLET, FILM COATED ORAL at 10:46

## 2022-03-14 RX ADMIN — FERROUS SULFATE TAB 325 MG (65 MG ELEMENTAL FE) SCH MG: 325 (65 FE) TAB at 16:19

## 2022-03-14 RX ADMIN — KETOROLAC TROMETHAMINE SCH DROP: 5 SOLUTION/ DROPS OPHTHALMIC at 21:10

## 2022-03-14 RX ADMIN — INSULIN HUMAN PRN UNITS: 100 INJECTION, SOLUTION PARENTERAL at 16:43

## 2022-03-14 RX ADMIN — RIFAXIMIN SCH MG: 550 TABLET ORAL at 10:55

## 2022-03-15 VITALS — SYSTOLIC BLOOD PRESSURE: 130 MMHG | DIASTOLIC BLOOD PRESSURE: 78 MMHG

## 2022-03-15 VITALS — DIASTOLIC BLOOD PRESSURE: 70 MMHG | SYSTOLIC BLOOD PRESSURE: 135 MMHG

## 2022-03-15 VITALS — SYSTOLIC BLOOD PRESSURE: 127 MMHG | DIASTOLIC BLOOD PRESSURE: 66 MMHG

## 2022-03-15 LAB
BASOPHILS # BLD AUTO: 0 K/UL (ref 0–0.2)
BASOPHILS NFR BLD AUTO: 0.4 % (ref 0–2)
BUN SERPL-MCNC: 23 MG/DL (ref 7–18)
CALCIUM SERPL-MCNC: 9.3 MG/DL (ref 8.5–10.1)
CHLORIDE SERPL-SCNC: 102 MMOL/L (ref 98–107)
CO2 SERPL-SCNC: 26 MMOL/L (ref 21–32)
CREAT SERPL-MCNC: 1.2 MG/DL (ref 0.6–1.3)
EOSINOPHIL NFR BLD AUTO: 2.1 % (ref 0–6)
GLUCOSE SERPL-MCNC: 122 MG/DL (ref 74–106)
HCT VFR BLD AUTO: 41 % (ref 39–51)
HGB BLD-MCNC: 13.3 G/DL (ref 13.5–17.5)
LYMPHOCYTES NFR BLD AUTO: 0.8 K/UL (ref 0.8–4.8)
LYMPHOCYTES NFR BLD AUTO: 13.8 % (ref 20–44)
MCHC RBC AUTO-ENTMCNC: 33 G/DL (ref 31–36)
MCV RBC AUTO: 80 FL (ref 80–96)
MONOCYTES NFR BLD AUTO: 0.6 K/UL (ref 0.1–1.3)
MONOCYTES NFR BLD AUTO: 11 % (ref 2–12)
NEUTROPHILS # BLD AUTO: 4 K/UL (ref 1.8–8.9)
NEUTROPHILS NFR BLD AUTO: 72.7 % (ref 43–81)
PLATELET # BLD AUTO: 115 K/UL (ref 150–450)
POTASSIUM SERPL-SCNC: 4.2 MMOL/L (ref 3.5–5.1)
RBC # BLD AUTO: 5.09 MIL/UL (ref 4.5–6)
SODIUM SERPL-SCNC: 136 MMOL/L (ref 136–145)
WBC NRBC COR # BLD AUTO: 5.5 K/UL (ref 4.3–11)

## 2022-03-15 RX ADMIN — CLOPIDOGREL BISULFATE SCH MG: 75 TABLET, FILM COATED ORAL at 08:13

## 2022-03-15 RX ADMIN — PREDNISOLONE ACETATE SCH ML: 10 SUSPENSION/ DROPS OPHTHALMIC at 08:17

## 2022-03-15 RX ADMIN — KETOROLAC TROMETHAMINE SCH DROP: 5 SOLUTION/ DROPS OPHTHALMIC at 08:17

## 2022-03-15 RX ADMIN — Medication SCH EACH: at 06:39

## 2022-03-15 RX ADMIN — ASPIRIN 81 MG SCH MG: 81 TABLET ORAL at 08:13

## 2022-03-15 RX ADMIN — GLIMEPIRIDE SCH MG: 4 TABLET ORAL at 08:13

## 2022-03-15 RX ADMIN — LOSARTAN POTASSIUM SCH MG: 50 TABLET, FILM COATED ORAL at 08:13

## 2022-03-15 RX ADMIN — RIFAXIMIN SCH MG: 550 TABLET ORAL at 08:13

## 2022-03-15 RX ADMIN — FERROUS SULFATE TAB 325 MG (65 MG ELEMENTAL FE) SCH MG: 325 (65 FE) TAB at 08:13

## 2022-03-15 RX ADMIN — PANTOPRAZOLE SODIUM SCH MG: 40 TABLET, DELAYED RELEASE ORAL at 06:31

## 2022-03-15 RX ADMIN — INSULIN HUMAN PRN UNITS: 100 INJECTION, SOLUTION PARENTERAL at 06:40

## 2022-03-15 RX ADMIN — METOPROLOL TARTRATE SCH MG: 50 TABLET, FILM COATED ORAL at 08:14

## 2022-07-21 ENCOUNTER — HOSPITAL ENCOUNTER (INPATIENT)
Dept: HOSPITAL 54 - ER | Age: 72
LOS: 3 days | Discharge: HOME HEALTH | DRG: 177 | End: 2022-07-24
Attending: INTERNAL MEDICINE | Admitting: INTERNAL MEDICINE
Payer: MEDICARE

## 2022-07-21 VITALS — BODY MASS INDEX: 28.32 KG/M2 | WEIGHT: 170 LBS | HEIGHT: 65 IN

## 2022-07-21 VITALS — DIASTOLIC BLOOD PRESSURE: 65 MMHG | SYSTOLIC BLOOD PRESSURE: 114 MMHG

## 2022-07-21 DIAGNOSIS — I11.0: ICD-10-CM

## 2022-07-21 DIAGNOSIS — R74.01: ICD-10-CM

## 2022-07-21 DIAGNOSIS — R09.02: ICD-10-CM
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
## 2022-07-21 DIAGNOSIS — N17.0: ICD-10-CM

## 2022-07-21 DIAGNOSIS — J12.82: ICD-10-CM

## 2022-07-21 DIAGNOSIS — E11.9: ICD-10-CM

## 2022-07-21 DIAGNOSIS — Z98.890: ICD-10-CM
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
## 2022-07-21 DIAGNOSIS — E72.20: ICD-10-CM

## 2022-07-21 DIAGNOSIS — Z91.041: ICD-10-CM

## 2022-07-21 DIAGNOSIS — Z79.82: ICD-10-CM

## 2022-07-21 DIAGNOSIS — S80.211A: ICD-10-CM

## 2022-07-21 DIAGNOSIS — K80.20: ICD-10-CM

## 2022-07-21 DIAGNOSIS — E78.5: ICD-10-CM

## 2022-07-21 DIAGNOSIS — Z86.73: ICD-10-CM

## 2022-07-21 DIAGNOSIS — I50.9: ICD-10-CM

## 2022-07-21 DIAGNOSIS — D64.9: ICD-10-CM

## 2022-07-21 DIAGNOSIS — K76.9: ICD-10-CM

## 2022-07-21 DIAGNOSIS — E87.1: ICD-10-CM

## 2022-07-21 DIAGNOSIS — Z79.84: ICD-10-CM

## 2022-07-21 DIAGNOSIS — K76.0: ICD-10-CM

## 2022-07-21 DIAGNOSIS — Z98.1: ICD-10-CM

## 2022-07-21 DIAGNOSIS — Z95.5: ICD-10-CM

## 2022-07-21 DIAGNOSIS — Z79.899: ICD-10-CM

## 2022-07-21 DIAGNOSIS — Z79.02: ICD-10-CM

## 2022-07-21 DIAGNOSIS — D69.6: ICD-10-CM

## 2022-07-21 DIAGNOSIS — I25.10: ICD-10-CM

## 2022-07-21 DIAGNOSIS — S80.212A: ICD-10-CM

## 2022-07-21 DIAGNOSIS — U07.1: Primary | ICD-10-CM

## 2022-07-21 DIAGNOSIS — G89.29: ICD-10-CM

## 2022-07-21 DIAGNOSIS — Z87.891: ICD-10-CM

## 2022-07-21 LAB
ALBUMIN SERPL BCP-MCNC: 3.7 G/DL (ref 3.4–5)
ALP SERPL-CCNC: 42 U/L (ref 46–116)
ALT SERPL W P-5'-P-CCNC: 128 U/L (ref 12–78)
AST SERPL W P-5'-P-CCNC: 161 U/L (ref 15–37)
BASOPHILS # BLD AUTO: 0 K/UL (ref 0–0.2)
BASOPHILS NFR BLD AUTO: 0.3 % (ref 0–2)
BILIRUB DIRECT SERPL-MCNC: 0.5 MG/DL (ref 0–0.2)
BILIRUB SERPL-MCNC: 1 MG/DL (ref 0.2–1)
BUN SERPL-MCNC: 20 MG/DL (ref 7–18)
CALCIUM SERPL-MCNC: 8.6 MG/DL (ref 8.5–10.1)
CHLORIDE SERPL-SCNC: 98 MMOL/L (ref 98–107)
CO2 SERPL-SCNC: 25 MMOL/L (ref 21–32)
CREAT SERPL-MCNC: 1.4 MG/DL (ref 0.6–1.3)
EOSINOPHIL NFR BLD AUTO: 0 % (ref 0–6)
GLUCOSE SERPL-MCNC: 74 MG/DL (ref 74–106)
HCT VFR BLD AUTO: 39 % (ref 39–51)
HGB BLD-MCNC: 12.7 G/DL (ref 13.5–17.5)
LYMPHOCYTES NFR BLD AUTO: 0.7 K/UL (ref 0.8–4.8)
LYMPHOCYTES NFR BLD AUTO: 14 % (ref 20–44)
LYMPHOCYTES NFR BLD MANUAL: 15 % (ref 16–48)
MCHC RBC AUTO-ENTMCNC: 33 G/DL (ref 31–36)
MCV RBC AUTO: 81 FL (ref 80–96)
MONOCYTES NFR BLD AUTO: 0.6 K/UL (ref 0.1–1.3)
MONOCYTES NFR BLD AUTO: 11.7 % (ref 2–12)
MONOCYTES NFR BLD MANUAL: 10 % (ref 0–11)
NEUTROPHILS # BLD AUTO: 3.6 K/UL (ref 1.8–8.9)
NEUTROPHILS NFR BLD AUTO: 74 % (ref 43–81)
NEUTS BAND NFR BLD MANUAL: 3 % (ref 0–5)
NEUTS SEG NFR BLD MANUAL: 72 % (ref 42–76)
PLATELET # BLD AUTO: 87 K/UL (ref 150–450)
POTASSIUM SERPL-SCNC: 3.9 MMOL/L (ref 3.5–5.1)
PROT SERPL-MCNC: 8.1 G/DL (ref 6.4–8.2)
RBC # BLD AUTO: 4.79 MIL/UL (ref 4.5–6)
SODIUM SERPL-SCNC: 133 MMOL/L (ref 136–145)
WBC NRBC COR # BLD AUTO: 4.8 K/UL (ref 4.3–11)

## 2022-07-21 PROCEDURE — G0378 HOSPITAL OBSERVATION PER HR: HCPCS

## 2022-07-21 PROCEDURE — C9803 HOPD COVID-19 SPEC COLLECT: HCPCS

## 2022-07-21 RX ADMIN — ENOXAPARIN SODIUM SCH MG: 40 INJECTION SUBCUTANEOUS at 16:40

## 2022-07-21 RX ADMIN — Medication SCH EACH: at 16:38

## 2022-07-21 RX ADMIN — Medication SCH EACH: at 21:55

## 2022-07-22 VITALS — SYSTOLIC BLOOD PRESSURE: 142 MMHG | DIASTOLIC BLOOD PRESSURE: 76 MMHG

## 2022-07-22 VITALS — SYSTOLIC BLOOD PRESSURE: 120 MMHG | DIASTOLIC BLOOD PRESSURE: 55 MMHG

## 2022-07-22 VITALS — SYSTOLIC BLOOD PRESSURE: 124 MMHG | DIASTOLIC BLOOD PRESSURE: 64 MMHG

## 2022-07-22 VITALS — SYSTOLIC BLOOD PRESSURE: 129 MMHG | DIASTOLIC BLOOD PRESSURE: 65 MMHG

## 2022-07-22 VITALS — SYSTOLIC BLOOD PRESSURE: 111 MMHG | DIASTOLIC BLOOD PRESSURE: 57 MMHG

## 2022-07-22 VITALS — SYSTOLIC BLOOD PRESSURE: 121 MMHG | DIASTOLIC BLOOD PRESSURE: 61 MMHG

## 2022-07-22 LAB
ALBUMIN SERPL BCP-MCNC: 3.2 G/DL (ref 3.4–5)
ALP SERPL-CCNC: 38 U/L (ref 46–116)
ALT SERPL W P-5'-P-CCNC: 108 U/L (ref 12–78)
AST SERPL W P-5'-P-CCNC: 118 U/L (ref 15–37)
BASOPHILS # BLD AUTO: 0 K/UL (ref 0–0.2)
BASOPHILS NFR BLD AUTO: 0.2 % (ref 0–2)
BILIRUB DIRECT SERPL-MCNC: 0.8 MG/DL (ref 0–0.2)
BILIRUB SERPL-MCNC: 1.5 MG/DL (ref 0.2–1)
BUN SERPL-MCNC: 20 MG/DL (ref 7–18)
CALCIUM SERPL-MCNC: 8.4 MG/DL (ref 8.5–10.1)
CHLORIDE SERPL-SCNC: 99 MMOL/L (ref 98–107)
CO2 SERPL-SCNC: 21 MMOL/L (ref 21–32)
CREAT SERPL-MCNC: 1.1 MG/DL (ref 0.6–1.3)
EOSINOPHIL NFR BLD AUTO: 0 % (ref 0–6)
FERRITIN SERPL-MCNC: 289 NG/ML (ref 8–388)
GLUCOSE SERPL-MCNC: 99 MG/DL (ref 74–106)
HCT VFR BLD AUTO: 36 % (ref 39–51)
HGB BLD-MCNC: 11.9 G/DL (ref 13.5–17.5)
IRON SERPL-MCNC: 10 UG/DL (ref 50–175)
LYMPHOCYTES NFR BLD AUTO: 0.9 K/UL (ref 0.8–4.8)
LYMPHOCYTES NFR BLD AUTO: 18.4 % (ref 20–44)
MAGNESIUM SERPL-MCNC: 2.2 MG/DL (ref 1.8–2.4)
MCHC RBC AUTO-ENTMCNC: 33 G/DL (ref 31–36)
MCV RBC AUTO: 81 FL (ref 80–96)
MONOCYTES NFR BLD AUTO: 0.7 K/UL (ref 0.1–1.3)
MONOCYTES NFR BLD AUTO: 13.9 % (ref 2–12)
NEUTROPHILS # BLD AUTO: 3.3 K/UL (ref 1.8–8.9)
NEUTROPHILS NFR BLD AUTO: 67.5 % (ref 43–81)
PHOSPHATE SERPL-MCNC: 2.8 MG/DL (ref 2.5–4.9)
PLATELET # BLD AUTO: 78 K/UL (ref 150–450)
POTASSIUM SERPL-SCNC: 3.4 MMOL/L (ref 3.5–5.1)
PROT SERPL-MCNC: 7.8 G/DL (ref 6.4–8.2)
RBC # BLD AUTO: 4.44 MIL/UL (ref 4.5–6)
SODIUM SERPL-SCNC: 131 MMOL/L (ref 136–145)
TIBC SERPL-MCNC: 321 UG/DL (ref 250–450)
TSH SERPL DL<=0.005 MIU/L-ACNC: 1.76 UIU/ML (ref 0.36–3.74)
WBC NRBC COR # BLD AUTO: 5 K/UL (ref 4.3–11)

## 2022-07-22 RX ADMIN — Medication SCH MG: at 17:48

## 2022-07-22 RX ADMIN — Medication SCH EACH: at 21:55

## 2022-07-22 RX ADMIN — Medication SCH EACH: at 17:48

## 2022-07-22 RX ADMIN — FUROSEMIDE SCH MG: 10 INJECTION, SOLUTION INTRAMUSCULAR; INTRAVENOUS at 11:34

## 2022-07-22 RX ADMIN — INSULIN HUMAN PRN UNITS: 100 INJECTION, SOLUTION PARENTERAL at 09:20

## 2022-07-22 RX ADMIN — HUMAN INSULIN PRN UNIT: 100 INJECTION, SOLUTION SUBCUTANEOUS at 21:26

## 2022-07-22 RX ADMIN — PANTOPRAZOLE SODIUM SCH MG: 40 TABLET, DELAYED RELEASE ORAL at 08:05

## 2022-07-22 RX ADMIN — ENOXAPARIN SODIUM SCH MG: 40 INJECTION SUBCUTANEOUS at 21:16

## 2022-07-22 RX ADMIN — ACETAMINOPHEN PRN MG: 325 TABLET ORAL at 12:45

## 2022-07-22 RX ADMIN — Medication SCH EACH: at 12:20

## 2022-07-22 RX ADMIN — INSULIN HUMAN PRN UNITS: 100 INJECTION, SOLUTION PARENTERAL at 12:16

## 2022-07-22 RX ADMIN — Medication SCH EACH: at 08:05

## 2022-07-22 RX ADMIN — INSULIN HUMAN PRN UNITS: 100 INJECTION, SOLUTION PARENTERAL at 17:52

## 2022-07-23 VITALS — SYSTOLIC BLOOD PRESSURE: 142 MMHG | DIASTOLIC BLOOD PRESSURE: 76 MMHG

## 2022-07-23 VITALS — DIASTOLIC BLOOD PRESSURE: 74 MMHG | SYSTOLIC BLOOD PRESSURE: 136 MMHG

## 2022-07-23 VITALS — SYSTOLIC BLOOD PRESSURE: 107 MMHG | DIASTOLIC BLOOD PRESSURE: 61 MMHG

## 2022-07-23 VITALS — DIASTOLIC BLOOD PRESSURE: 77 MMHG | SYSTOLIC BLOOD PRESSURE: 149 MMHG

## 2022-07-23 VITALS — SYSTOLIC BLOOD PRESSURE: 122 MMHG | DIASTOLIC BLOOD PRESSURE: 70 MMHG

## 2022-07-23 VITALS — DIASTOLIC BLOOD PRESSURE: 66 MMHG | SYSTOLIC BLOOD PRESSURE: 121 MMHG

## 2022-07-23 LAB
ALBUMIN SERPL BCP-MCNC: 3 G/DL (ref 3.4–5)
ALP SERPL-CCNC: 38 U/L (ref 46–116)
ALT SERPL W P-5'-P-CCNC: 81 U/L (ref 12–78)
AST SERPL W P-5'-P-CCNC: 74 U/L (ref 15–37)
BASOPHILS # BLD AUTO: 0 K/UL (ref 0–0.2)
BASOPHILS NFR BLD AUTO: 0.4 % (ref 0–2)
BILIRUB DIRECT SERPL-MCNC: 0.7 MG/DL (ref 0–0.2)
BILIRUB SERPL-MCNC: 1.3 MG/DL (ref 0.2–1)
BUN SERPL-MCNC: 24 MG/DL (ref 7–18)
CALCIUM SERPL-MCNC: 8.4 MG/DL (ref 8.5–10.1)
CHLORIDE SERPL-SCNC: 99 MMOL/L (ref 98–107)
CHOLEST SERPL-MCNC: 101 MG/DL (ref ?–200)
CO2 SERPL-SCNC: 23 MMOL/L (ref 21–32)
CREAT SERPL-MCNC: 1.2 MG/DL (ref 0.6–1.3)
EOSINOPHIL NFR BLD AUTO: 0.8 % (ref 0–6)
GLUCOSE SERPL-MCNC: 137 MG/DL (ref 74–106)
HCT VFR BLD AUTO: 35 % (ref 39–51)
HDLC SERPL-MCNC: 14 MG/DL (ref 40–60)
HGB BLD-MCNC: 11.4 G/DL (ref 13.5–17.5)
LDLC SERPL DIRECT ASSAY-MCNC: 41 MG/DL (ref 0–99)
LYMPHOCYTES NFR BLD AUTO: 0.8 K/UL (ref 0.8–4.8)
LYMPHOCYTES NFR BLD AUTO: 24.4 % (ref 20–44)
MAGNESIUM SERPL-MCNC: 2.2 MG/DL (ref 1.8–2.4)
MCHC RBC AUTO-ENTMCNC: 32 G/DL (ref 31–36)
MCV RBC AUTO: 81 FL (ref 80–96)
MONOCYTES NFR BLD AUTO: 0.5 K/UL (ref 0.1–1.3)
MONOCYTES NFR BLD AUTO: 14.3 % (ref 2–12)
NEUTROPHILS # BLD AUTO: 2 K/UL (ref 1.8–8.9)
NEUTROPHILS NFR BLD AUTO: 60.1 % (ref 43–81)
PHOSPHATE SERPL-MCNC: 2.6 MG/DL (ref 2.5–4.9)
PLATELET # BLD AUTO: 88 K/UL (ref 150–450)
POTASSIUM SERPL-SCNC: 3.2 MMOL/L (ref 3.5–5.1)
PROT SERPL-MCNC: 7.5 G/DL (ref 6.4–8.2)
RBC # BLD AUTO: 4.38 MIL/UL (ref 4.5–6)
SODIUM SERPL-SCNC: 132 MMOL/L (ref 136–145)
TRIGL SERPL-MCNC: 288 MG/DL (ref 30–150)
WBC NRBC COR # BLD AUTO: 3.3 K/UL (ref 4.3–11)

## 2022-07-23 RX ADMIN — HUMAN INSULIN PRN UNIT: 100 INJECTION, SOLUTION SUBCUTANEOUS at 21:30

## 2022-07-23 RX ADMIN — Medication SCH MG: at 08:32

## 2022-07-23 RX ADMIN — Medication SCH EACH: at 07:36

## 2022-07-23 RX ADMIN — HUMAN INSULIN PRN UNIT: 100 INJECTION, SOLUTION SUBCUTANEOUS at 11:55

## 2022-07-23 RX ADMIN — Medication SCH EACH: at 21:26

## 2022-07-23 RX ADMIN — PANTOPRAZOLE SODIUM SCH MG: 40 TABLET, DELAYED RELEASE ORAL at 07:38

## 2022-07-23 RX ADMIN — Medication SCH EACH: at 16:32

## 2022-07-23 RX ADMIN — Medication SCH MG: at 16:32

## 2022-07-23 RX ADMIN — ACETAMINOPHEN PRN MG: 325 TABLET ORAL at 00:50

## 2022-07-23 RX ADMIN — ENOXAPARIN SODIUM SCH MG: 40 INJECTION SUBCUTANEOUS at 21:21

## 2022-07-23 RX ADMIN — INSULIN HUMAN PRN UNITS: 100 INJECTION, SOLUTION PARENTERAL at 16:33

## 2022-07-23 RX ADMIN — Medication SCH EACH: at 11:52

## 2022-07-23 RX ADMIN — FUROSEMIDE SCH MG: 10 INJECTION, SOLUTION INTRAMUSCULAR; INTRAVENOUS at 08:32

## 2022-07-24 VITALS — SYSTOLIC BLOOD PRESSURE: 127 MMHG | DIASTOLIC BLOOD PRESSURE: 71 MMHG

## 2022-07-24 VITALS — DIASTOLIC BLOOD PRESSURE: 77 MMHG | SYSTOLIC BLOOD PRESSURE: 149 MMHG

## 2022-07-24 LAB
ALBUMIN SERPL BCP-MCNC: 3 G/DL (ref 3.4–5)
ALP SERPL-CCNC: 51 U/L (ref 46–116)
ALT SERPL W P-5'-P-CCNC: 77 U/L (ref 12–78)
AMMONIA PLAS-SCNC: 35 UMOL/L (ref 11–32)
AST SERPL W P-5'-P-CCNC: 67 U/L (ref 15–37)
BASOPHILS # BLD AUTO: 0 K/UL (ref 0–0.2)
BASOPHILS NFR BLD AUTO: 0.1 % (ref 0–2)
BILIRUB DIRECT SERPL-MCNC: 0.5 MG/DL (ref 0–0.2)
BILIRUB SERPL-MCNC: 0.9 MG/DL (ref 0.2–1)
BUN SERPL-MCNC: 23 MG/DL (ref 7–18)
CALCIUM SERPL-MCNC: 8.7 MG/DL (ref 8.5–10.1)
CHLORIDE SERPL-SCNC: 102 MMOL/L (ref 98–107)
CO2 SERPL-SCNC: 24 MMOL/L (ref 21–32)
CREAT SERPL-MCNC: 1 MG/DL (ref 0.6–1.3)
EOSINOPHIL NFR BLD AUTO: 2.6 % (ref 0–6)
EOSINOPHIL NFR BLD MANUAL: 3 % (ref 0–4)
GLUCOSE SERPL-MCNC: 129 MG/DL (ref 74–106)
HCT VFR BLD AUTO: 35 % (ref 39–51)
HGB BLD-MCNC: 11.3 G/DL (ref 13.5–17.5)
LYMPHOCYTES NFR BLD AUTO: 0.6 K/UL (ref 0.8–4.8)
LYMPHOCYTES NFR BLD AUTO: 22.6 % (ref 20–44)
LYMPHOCYTES NFR BLD MANUAL: 24 % (ref 16–48)
MAGNESIUM SERPL-MCNC: 2.2 MG/DL (ref 1.8–2.4)
MCHC RBC AUTO-ENTMCNC: 33 G/DL (ref 31–36)
MCV RBC AUTO: 80 FL (ref 80–96)
MONOCYTES NFR BLD AUTO: 0.3 K/UL (ref 0.1–1.3)
MONOCYTES NFR BLD AUTO: 12.3 % (ref 2–12)
MONOCYTES NFR BLD MANUAL: 11 % (ref 0–11)
NEUTROPHILS # BLD AUTO: 1.5 K/UL (ref 1.8–8.9)
NEUTROPHILS NFR BLD AUTO: 62.4 % (ref 43–81)
NEUTS SEG NFR BLD MANUAL: 62 % (ref 42–76)
PHOSPHATE SERPL-MCNC: 2.8 MG/DL (ref 2.5–4.9)
PLATELET # BLD AUTO: 102 K/UL (ref 150–450)
POTASSIUM SERPL-SCNC: 3.8 MMOL/L (ref 3.5–5.1)
PROT SERPL-MCNC: 7.6 G/DL (ref 6.4–8.2)
RBC # BLD AUTO: 4.33 MIL/UL (ref 4.5–6)
SODIUM SERPL-SCNC: 135 MMOL/L (ref 136–145)
WBC NRBC COR # BLD AUTO: 2.5 K/UL (ref 4.3–11)

## 2022-07-24 RX ADMIN — Medication SCH MG: at 09:11

## 2022-07-24 RX ADMIN — Medication SCH EACH: at 07:30

## 2022-07-24 RX ADMIN — Medication SCH EACH: at 12:00

## 2022-07-24 RX ADMIN — FUROSEMIDE SCH MG: 10 INJECTION, SOLUTION INTRAMUSCULAR; INTRAVENOUS at 09:11

## 2022-07-24 RX ADMIN — PANTOPRAZOLE SODIUM SCH MG: 40 TABLET, DELAYED RELEASE ORAL at 09:11

## 2022-07-26 ENCOUNTER — HOSPITAL ENCOUNTER (EMERGENCY)
Dept: HOSPITAL 54 - ER | Age: 72
LOS: 1 days | Discharge: HOME | End: 2022-07-27
Payer: MEDICARE

## 2022-07-26 VITALS — HEIGHT: 65 IN | BODY MASS INDEX: 26.49 KG/M2 | WEIGHT: 159 LBS

## 2022-07-26 DIAGNOSIS — Z79.84: ICD-10-CM

## 2022-07-26 DIAGNOSIS — R53.1: ICD-10-CM

## 2022-07-26 DIAGNOSIS — I50.9: ICD-10-CM

## 2022-07-26 DIAGNOSIS — Z88.8: ICD-10-CM

## 2022-07-26 DIAGNOSIS — Z79.899: ICD-10-CM

## 2022-07-26 DIAGNOSIS — E11.9: ICD-10-CM

## 2022-07-26 DIAGNOSIS — I11.0: ICD-10-CM

## 2022-07-26 DIAGNOSIS — Z98.890: ICD-10-CM

## 2022-07-26 DIAGNOSIS — Z79.82: ICD-10-CM

## 2022-07-26 DIAGNOSIS — Z60.2: ICD-10-CM

## 2022-07-26 DIAGNOSIS — N17.9: Primary | ICD-10-CM

## 2022-07-26 LAB
BASOPHILS # BLD AUTO: 0 K/UL (ref 0–0.2)
BASOPHILS NFR BLD AUTO: 0.3 % (ref 0–2)
BUN SERPL-MCNC: 29 MG/DL (ref 7–18)
CALCIUM SERPL-MCNC: 9 MG/DL (ref 8.5–10.1)
CHLORIDE SERPL-SCNC: 98 MMOL/L (ref 98–107)
CO2 SERPL-SCNC: 23 MMOL/L (ref 21–32)
CREAT SERPL-MCNC: 1.5 MG/DL (ref 0.6–1.3)
EOSINOPHIL NFR BLD AUTO: 1.8 % (ref 0–6)
GLUCOSE SERPL-MCNC: 254 MG/DL (ref 74–106)
HCT VFR BLD AUTO: 34 % (ref 39–51)
HGB BLD-MCNC: 11.3 G/DL (ref 13.5–17.5)
LYMPHOCYTES NFR BLD AUTO: 0.5 K/UL (ref 0.8–4.8)
LYMPHOCYTES NFR BLD AUTO: 16.3 % (ref 20–44)
MCHC RBC AUTO-ENTMCNC: 33 G/DL (ref 31–36)
MCV RBC AUTO: 80 FL (ref 80–96)
MONOCYTES NFR BLD AUTO: 0.4 K/UL (ref 0.1–1.3)
MONOCYTES NFR BLD AUTO: 14 % (ref 2–12)
NEUTROPHILS # BLD AUTO: 1.9 K/UL (ref 1.8–8.9)
NEUTROPHILS NFR BLD AUTO: 67.6 % (ref 43–81)
PLATELET # BLD AUTO: 145 K/UL (ref 150–450)
POTASSIUM SERPL-SCNC: 4.2 MMOL/L (ref 3.5–5.1)
RBC # BLD AUTO: 4.27 MIL/UL (ref 4.5–6)
SODIUM SERPL-SCNC: 131 MMOL/L (ref 136–145)
WBC NRBC COR # BLD AUTO: 2.9 K/UL (ref 4.3–11)

## 2022-07-26 SDOH — SOCIAL STABILITY - SOCIAL INSECURITY: PROBLEMS RELATED TO LIVING ALONE: Z60.2

## 2022-07-27 VITALS — DIASTOLIC BLOOD PRESSURE: 77 MMHG | SYSTOLIC BLOOD PRESSURE: 165 MMHG

## 2022-08-08 ENCOUNTER — OFFICE (OUTPATIENT)
Dept: URBAN - METROPOLITAN AREA CLINIC 45 | Facility: CLINIC | Age: 72
End: 2022-08-08

## 2022-08-08 VITALS
HEIGHT: 65 IN | SYSTOLIC BLOOD PRESSURE: 112 MMHG | DIASTOLIC BLOOD PRESSURE: 59 MMHG | TEMPERATURE: 97.4 F | WEIGHT: 168 LBS | HEART RATE: 69 BPM

## 2022-08-08 DIAGNOSIS — K55.20 ANGIODYSPLASIA OF COLON WITHOUT HEMORRHAGE: ICD-10-CM

## 2022-08-08 DIAGNOSIS — E11.9 DIABETES MELLITUS - NON ID, UNSPECIFIED: ICD-10-CM

## 2022-08-08 DIAGNOSIS — R63.4 WEIGHT LOSS: ICD-10-CM

## 2022-08-08 DIAGNOSIS — D64.9: ICD-10-CM

## 2022-08-08 PROCEDURE — 99214 OFFICE O/P EST MOD 30 MIN: CPT | Performed by: INTERNAL MEDICINE

## 2022-08-08 NOTE — SERVICEHPINOTES
The patient returns for office follow-up of compensated liver cirrhosis. Patient reports losing almost 20 pounds since last visit in May of this year. Apparently, he was recently hospitalized to a local hospital for congestive heart failure and Covid. He has been treated with diuresis.  Meanwhile, he denies having increase abdominal girth/ascites, mental confusion or somnolence to suggest decompensation of chronic liver disease. He denies melena, hematochezia, abdominal pain, or lightheadedness. He thinks his recent lab work was stable, but may have showed renal insufficiency. Prior EGD and colonoscopy performed by me in October 2019 to evaluate hemoglobin drop revealed grade 1-2 esophageal varices without stigmata of bleeding. There was a large benign angry-looking sessile polyp present in the body of the stomach, which was biopsied. Another sessile 1 cm polyp was removed by snare polypectomy, also showing benign hyperplastic histology. Patient was recommended to be routinely followed every 6-months for HCC surveillance, yet has not been previously compliant with recommendations.

## 2022-08-17 LAB — US ABDOMEN COMPLETE: PDF REPORT: (no result)

## 2023-03-21 ENCOUNTER — OFFICE (OUTPATIENT)
Dept: URBAN - METROPOLITAN AREA CLINIC 45 | Facility: CLINIC | Age: 73
End: 2023-03-21

## 2023-03-21 VITALS — HEIGHT: 65 IN | WEIGHT: 182 LBS

## 2023-03-21 DIAGNOSIS — E78.2 MIXED HYPERLIPIDEMIA: ICD-10-CM

## 2023-03-21 DIAGNOSIS — E11.9 DIABETES MELLITUS - NON ID, UNSPECIFIED: ICD-10-CM

## 2023-03-21 DIAGNOSIS — K70.30: ICD-10-CM

## 2023-03-21 DIAGNOSIS — I85.00 ESOPHAGEAL VARICES WITHOUT BLEEDING: ICD-10-CM

## 2023-03-21 DIAGNOSIS — I67.89: ICD-10-CM

## 2023-03-21 DIAGNOSIS — D64.9: ICD-10-CM

## 2023-03-21 PROCEDURE — 99214 OFFICE O/P EST MOD 30 MIN: CPT | Mod: 95 | Performed by: INTERNAL MEDICINE

## 2023-03-21 NOTE — SERVICEHPINOTES
The patient is scheduled today for a telehealth visit, due to current mandate for social distancing on account of the COVID-19 Pandemic. The clinician is connected to a secure network. The patient consents to this teleconference being a billable service.   This visit used [email or web address] for a live, interactive audio and video telehealth visit.   The patient returns for follow-up of compensated liver cirrhosis with prior encephalopathy and nonbleeding esophageal varices. He has been out of Xifaxan for several weeks and his concerned about possible return of encephalopathy. He denies increased abdominal girth or mental confusion to suggest decompensation. He denies melena, hematochezia, abdominal pain, or lightheadedness. His prior lab work and abdominal ultrasound done about 6 months ago were stable. Prior EGD and colonoscopy performed by me in October 2019 to evaluate hemoglobin drop revealed grade 2-3 esophageal varices without stigmata of bleeding. There was a large benign angry-looking sessile polyp present in the body of the stomach, which was biopsied. Another sessile 1 cm polyp was removed by snare polypectomy showing benign hyperplastic histology. Patient was recommended to be routinely followed every 6-months for HCC surveillance, yet has not been previously compliant with recommendations.

## 2023-03-21 NOTE — SERVICENOTES
44 minutes were spent in dedicated E/M time during the date of service, including preparation of the medical chart, review of previous information, data analysis/discussion, and/or discussion with the patient/family/caregiver

## 2023-03-31 ENCOUNTER — OFFICE (OUTPATIENT)
Dept: URBAN - METROPOLITAN AREA CLINIC 45 | Facility: CLINIC | Age: 73
End: 2023-03-31

## 2023-03-31 VITALS — HEIGHT: 65 IN | WEIGHT: 180 LBS

## 2023-03-31 DIAGNOSIS — D64.9: ICD-10-CM

## 2023-03-31 DIAGNOSIS — I85.00: ICD-10-CM

## 2023-03-31 DIAGNOSIS — M54.16: ICD-10-CM

## 2023-03-31 DIAGNOSIS — I67.89: ICD-10-CM

## 2023-03-31 DIAGNOSIS — K70.30: ICD-10-CM

## 2023-03-31 DIAGNOSIS — E78.2 MIXED HYPERLIPIDEMIA: ICD-10-CM

## 2023-03-31 PROCEDURE — 99213 OFFICE O/P EST LOW 20 MIN: CPT | Mod: 95 | Performed by: INTERNAL MEDICINE

## 2023-03-31 NOTE — SERVICEHPINOTES
The patient is scheduled today for a telehealth visit, due to current mandate for social distancing on account of the COVID-19 Pandemic. The clinician is connected to a secure network. The patient consents to this teleconference being a billable service.   This visit used [email or web address] for a live, interactive audio and video telehealth visit.   The patient returns for follow-up on labs and abdominal ultrasound done for surveillance of HCC in the setting of compensated liver cirrhosis with prior encephalopathy and nonbleeding esophageal varices. He has still not been able to get previously used Xifaxan for almost a month, and is concerned about worsening of encephalopathy. His lab work and abdominal ultrasound are fairly stable and unchanged from studies done about 6 months ago, with the exception of very high triglyceride level of 980. Patient reportedly started taking additional medication to bring triglycerides level down.

## 2023-03-31 NOTE — SERVICENOTES
27 minutes were spent in dedicated E/M time during the date of service, including preparation of the medical chart, review of previous information, data analysis/discussion, and/or discussion with the patient/family/caregiver

## 2023-09-12 LAB — US ABDOMEN COMPLETE: PDF REPORT: (no result)

## 2023-09-13 ENCOUNTER — OFFICE (OUTPATIENT)
Dept: URBAN - METROPOLITAN AREA CLINIC 45 | Facility: CLINIC | Age: 73
End: 2023-09-13

## 2023-09-13 VITALS — WEIGHT: 180 LBS | HEIGHT: 65 IN

## 2023-09-13 DIAGNOSIS — Z95.5 STENTED CORONARY ARTERY: ICD-10-CM

## 2023-09-13 DIAGNOSIS — K70.30: ICD-10-CM

## 2023-09-13 DIAGNOSIS — I85.00: ICD-10-CM

## 2023-09-13 DIAGNOSIS — M54.16: ICD-10-CM

## 2023-09-13 DIAGNOSIS — I67.89: ICD-10-CM

## 2023-09-13 PROCEDURE — 99214 OFFICE O/P EST MOD 30 MIN: CPT | Mod: 95 | Performed by: INTERNAL MEDICINE

## 2024-03-14 ENCOUNTER — OFFICE (OUTPATIENT)
Dept: URBAN - METROPOLITAN AREA CLINIC 45 | Facility: CLINIC | Age: 74
End: 2024-03-14

## 2024-03-14 VITALS
HEART RATE: 66 BPM | WEIGHT: 182 LBS | SYSTOLIC BLOOD PRESSURE: 137 MMHG | DIASTOLIC BLOOD PRESSURE: 67 MMHG | HEIGHT: 65 IN

## 2024-03-14 DIAGNOSIS — R05.3: ICD-10-CM

## 2024-03-14 DIAGNOSIS — K70.30: ICD-10-CM

## 2024-03-14 DIAGNOSIS — E66.9 OBESITY: ICD-10-CM

## 2024-03-14 DIAGNOSIS — Z12.11 SCREENING FOR COLONIC NEOPLASIA: ICD-10-CM

## 2024-03-14 DIAGNOSIS — I85.00: ICD-10-CM

## 2024-03-14 PROCEDURE — 99215 OFFICE O/P EST HI 40 MIN: CPT | Performed by: INTERNAL MEDICINE

## 2024-03-14 NOTE — SERVICEHPINOTES
The patient returns for routine follow-up of compensated liver cirrhosis. He reports no new symptoms, except intermittent mental confusion experienced while he is off Xifaxan. Prior abdominal ultrasound done in 9/23 for surveillance of HCC showed no change from prior study 6 month ago. Labs well overall stable, showing mild anemia which has been chronic. He has been taking Xifaxan with resolution of encephalopathy. His labs were stable a couple months ago, with the exception of very high triglyceride level. Patient is taking additional medication to bring triglycerides level down. There has been a previous EGD with colon screening none by me in 10/2019. Colonoscopy was partially limited by suboptimal bowel preparation. EGD showed small esophageal varices, portal hypertensive gastropathy with benign-looking polyps.

## 2024-03-19 ENCOUNTER — HOSPITAL ENCOUNTER (INPATIENT)
Dept: HOSPITAL 54 - ER | Age: 74
LOS: 3 days | Discharge: HOSPICE | DRG: 432 | End: 2024-03-22
Attending: NURSE PRACTITIONER | Admitting: NURSE PRACTITIONER
Payer: MEDICARE

## 2024-03-19 VITALS — TEMPERATURE: 98.8 F | SYSTOLIC BLOOD PRESSURE: 138 MMHG | OXYGEN SATURATION: 98 % | DIASTOLIC BLOOD PRESSURE: 79 MMHG

## 2024-03-19 VITALS — BODY MASS INDEX: 29.49 KG/M2 | HEIGHT: 65 IN | WEIGHT: 177 LBS

## 2024-03-19 DIAGNOSIS — C50.929: ICD-10-CM

## 2024-03-19 DIAGNOSIS — K76.82: ICD-10-CM

## 2024-03-19 DIAGNOSIS — N18.9: ICD-10-CM

## 2024-03-19 DIAGNOSIS — N17.0: ICD-10-CM

## 2024-03-19 DIAGNOSIS — K70.31: Primary | ICD-10-CM

## 2024-03-19 DIAGNOSIS — E88.09: ICD-10-CM

## 2024-03-19 DIAGNOSIS — D64.9: ICD-10-CM

## 2024-03-19 DIAGNOSIS — Z79.02: ICD-10-CM

## 2024-03-19 DIAGNOSIS — D61.818: ICD-10-CM
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
## 2024-03-19 DIAGNOSIS — K21.9: ICD-10-CM

## 2024-03-19 DIAGNOSIS — Z79.82: ICD-10-CM

## 2024-03-19 DIAGNOSIS — I50.33: ICD-10-CM

## 2024-03-19 DIAGNOSIS — E66.9: ICD-10-CM

## 2024-03-19 DIAGNOSIS — I13.0: ICD-10-CM

## 2024-03-19 DIAGNOSIS — Z79.01: ICD-10-CM

## 2024-03-19 DIAGNOSIS — E44.0: ICD-10-CM

## 2024-03-19 DIAGNOSIS — E83.42: ICD-10-CM

## 2024-03-19 DIAGNOSIS — I25.2: ICD-10-CM

## 2024-03-19 DIAGNOSIS — Z79.4: ICD-10-CM

## 2024-03-19 DIAGNOSIS — M19.90: ICD-10-CM

## 2024-03-19 DIAGNOSIS — K76.6: ICD-10-CM

## 2024-03-19 DIAGNOSIS — I25.10: ICD-10-CM

## 2024-03-19 DIAGNOSIS — Z95.5: ICD-10-CM

## 2024-03-19 DIAGNOSIS — Z79.84: ICD-10-CM
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
## 2024-03-19 DIAGNOSIS — Z98.1: ICD-10-CM

## 2024-03-19 DIAGNOSIS — M89.8X9: ICD-10-CM

## 2024-03-19 DIAGNOSIS — R53.1: ICD-10-CM

## 2024-03-19 DIAGNOSIS — Z98.890: ICD-10-CM

## 2024-03-19 DIAGNOSIS — Z91.041: ICD-10-CM

## 2024-03-19 DIAGNOSIS — R16.1: ICD-10-CM

## 2024-03-19 DIAGNOSIS — I69.354: ICD-10-CM

## 2024-03-19 DIAGNOSIS — K80.20: ICD-10-CM

## 2024-03-19 DIAGNOSIS — E11.22: ICD-10-CM

## 2024-03-19 DIAGNOSIS — R26.9: ICD-10-CM

## 2024-03-19 DIAGNOSIS — R62.7: ICD-10-CM

## 2024-03-19 LAB
ALBUMIN SERPL BCP-MCNC: 3.5 G/DL (ref 3.4–5)
ALP SERPL-CCNC: 77 U/L (ref 46–116)
ALT SERPL W P-5'-P-CCNC: 81 U/L (ref 12–78)
ANISOCYTOSIS BLD QL: (no result)
APPEARANCE UR: CLEAR
APTT PPP: 23.9 SEC (ref 24.3–34.3)
AST SERPL W P-5'-P-CCNC: 58 U/L (ref 15–37)
BACTERIA UR CULT: NO
BASOPHILS # BLD AUTO: 0 K/UL (ref 0–0.2)
BASOPHILS NFR BLD AUTO: 0.5 % (ref 0–2)
BILIRUB DIRECT SERPL-MCNC: 0.4 MG/DL (ref 0–0.2)
BILIRUB SERPL-MCNC: 0.7 MG/DL (ref 0.2–1)
BILIRUB UR QL STRIP: NEGATIVE
BUN SERPL-MCNC: 28 MG/DL (ref 7–18)
CALCIUM SERPL-MCNC: 9.6 MG/DL (ref 8.5–10.1)
CHLORIDE SERPL-SCNC: 103 MMOL/L (ref 98–107)
CO2 SERPL-SCNC: 27 MMOL/L (ref 21–32)
COLOR UR: YELLOW
CREAT SERPL-MCNC: 1.2 MG/DL (ref 0.6–1.3)
EOSINOPHIL # BLD AUTO: 0 K/UL (ref 0–0.7)
EOSINOPHIL NFR BLD AUTO: 1.5 % (ref 0–6)
EOSINOPHIL NFR BLD MANUAL: 2 % (ref 0–4)
ERYTHROCYTE [DISTWIDTH] IN BLOOD BY AUTOMATED COUNT: 17.1 % (ref 11.5–15)
GLUCOSE SERPL-MCNC: 96 MG/DL (ref 74–106)
GLUCOSE UR STRIP-MCNC: (no result) MG/DL
HCT VFR BLD AUTO: 34 % (ref 39–51)
HGB BLD-MCNC: 11.1 G/DL (ref 13.5–17.5)
HGB UR QL STRIP: NEGATIVE ERY/UL
INR PPP: 1.19 (ref 0.91–1.1)
KETONES UR STRIP-MCNC: NEGATIVE MG/DL
LEUKOCYTE ESTERASE UR QL STRIP: NEGATIVE
LYMPHOCYTES NFR BLD AUTO: 0.6 K/UL (ref 0.8–4.8)
LYMPHOCYTES NFR BLD AUTO: 19 % (ref 20–44)
LYMPHOCYTES NFR BLD MANUAL: 29 % (ref 16–48)
MCH RBC QN AUTO: 27 PG (ref 26–33)
MCHC RBC AUTO-ENTMCNC: 33 G/DL (ref 31–36)
MCV RBC AUTO: 82 FL (ref 80–96)
MONOCYTES NFR BLD AUTO: 0.3 K/UL (ref 0.1–1.3)
MONOCYTES NFR BLD AUTO: 8.9 % (ref 2–12)
MONOCYTES NFR BLD MANUAL: 6 % (ref 0–11)
NEUTROPHILS # BLD AUTO: 2.3 K/UL (ref 1.8–8.9)
NEUTROPHILS NFR BLD AUTO: 70.1 % (ref 43–81)
NEUTS SEG NFR BLD MANUAL: 63 % (ref 42–76)
NITRITE UR QL STRIP: NEGATIVE
PH UR STRIP: 6 [PH] (ref 5–8)
PLATELET # BLD AUTO: 84 K/UL (ref 150–450)
PLATELET BLD QL SMEAR: (no result)
POTASSIUM SERPL-SCNC: 3.9 MMOL/L (ref 3.5–5.1)
PROT SERPL-MCNC: 7.6 G/DL (ref 6.4–8.2)
PROT UR QL STRIP: NEGATIVE MG/DL
PROTHROMBIN TIME: 12.5 SECS (ref 9.2–11.1)
RBC # BLD AUTO: 4.1 MIL/UL (ref 4.5–6)
RBC #/AREA URNS HPF: (no result) /HPF (ref 0–2)
SODIUM SERPL-SCNC: 137 MMOL/L (ref 136–145)
SP GR UR STRIP: 1.02 (ref 1–1.03)
UROBILINOGEN UR STRIP-MCNC: 2 EU/DL
WBC #/AREA URNS HPF: (no result) /HPF (ref 0–3)
WBC NRBC COR # BLD AUTO: 3.3 K/UL (ref 4.3–11)

## 2024-03-19 PROCEDURE — P9045 ALBUMIN (HUMAN), 5%, 250 ML: HCPCS

## 2024-03-19 PROCEDURE — G0378 HOSPITAL OBSERVATION PER HR: HCPCS

## 2024-03-19 PROCEDURE — A4216 STERILE WATER/SALINE, 10 ML: HCPCS

## 2024-03-19 RX ADMIN — SODIUM CHLORIDE PRN MLS/HR: 9 INJECTION, SOLUTION INTRAVENOUS at 22:50

## 2024-03-19 RX ADMIN — ASPIRIN ONE MG: 325 TABLET, FILM COATED ORAL at 18:28

## 2024-03-19 RX ADMIN — SODIUM CHLORIDE ONE ML: 9 INJECTION, SOLUTION INTRAVENOUS at 16:13

## 2024-03-19 RX ADMIN — Medication SCH EACH: at 22:21

## 2024-03-20 VITALS — TEMPERATURE: 97.7 F | OXYGEN SATURATION: 97 % | SYSTOLIC BLOOD PRESSURE: 148 MMHG | DIASTOLIC BLOOD PRESSURE: 87 MMHG

## 2024-03-20 VITALS — DIASTOLIC BLOOD PRESSURE: 82 MMHG | TEMPERATURE: 98.8 F | OXYGEN SATURATION: 98 % | SYSTOLIC BLOOD PRESSURE: 130 MMHG

## 2024-03-20 VITALS — SYSTOLIC BLOOD PRESSURE: 148 MMHG | OXYGEN SATURATION: 94 % | DIASTOLIC BLOOD PRESSURE: 76 MMHG | TEMPERATURE: 98.6 F

## 2024-03-20 VITALS — OXYGEN SATURATION: 91 % | TEMPERATURE: 98.6 F | SYSTOLIC BLOOD PRESSURE: 174 MMHG | DIASTOLIC BLOOD PRESSURE: 85 MMHG

## 2024-03-20 VITALS — TEMPERATURE: 98.9 F | SYSTOLIC BLOOD PRESSURE: 142 MMHG | DIASTOLIC BLOOD PRESSURE: 80 MMHG | OXYGEN SATURATION: 97 %

## 2024-03-20 VITALS — SYSTOLIC BLOOD PRESSURE: 142 MMHG | DIASTOLIC BLOOD PRESSURE: 80 MMHG | OXYGEN SATURATION: 97 % | TEMPERATURE: 98.7 F

## 2024-03-20 LAB
ALBUMIN SERPL BCP-MCNC: 3.2 G/DL (ref 3.4–5)
ALP SERPL-CCNC: 58 U/L (ref 46–116)
ALT SERPL W P-5'-P-CCNC: 73 U/L (ref 12–78)
ANISOCYTOSIS BLD QL: (no result)
APPEARANCE UR: CLEAR
AST SERPL W P-5'-P-CCNC: 55 U/L (ref 15–37)
BACTERIA UR CULT: NO
BASOPHILS # BLD AUTO: 0 K/UL (ref 0–0.2)
BASOPHILS NFR BLD AUTO: 0.4 % (ref 0–2)
BASOPHILS NFR BLD MANUAL: 0 % (ref 0–2)
BILIRUB SERPL-MCNC: 0.8 MG/DL (ref 0.2–1)
BILIRUB UR QL STRIP: NEGATIVE
BUN SERPL-MCNC: 24 MG/DL (ref 7–18)
CALCIUM SERPL-MCNC: 8.9 MG/DL (ref 8.5–10.1)
CHLORIDE SERPL-SCNC: 105 MMOL/L (ref 98–107)
CHOLEST SERPL-MCNC: 136 MG/DL (ref ?–200)
CK SERPL-CCNC: 73 U/L (ref 39–308)
CO2 SERPL-SCNC: 24 MMOL/L (ref 21–32)
COLOR UR: YELLOW
CREAT SERPL-MCNC: 1.4 MG/DL (ref 0.6–1.3)
CREAT UR-MCNC: 74.2 MG/DL (ref 30–125)
EOSINOPHIL # BLD AUTO: 0.1 K/UL (ref 0–0.7)
EOSINOPHIL NFR BLD AUTO: 2.1 % (ref 0–6)
EOSINOPHIL NFR BLD MANUAL: 2 % (ref 0–4)
ERYTHROCYTE [DISTWIDTH] IN BLOOD BY AUTOMATED COUNT: 17.4 % (ref 11.5–15)
GLUCOSE SERPL-MCNC: 81 MG/DL (ref 74–106)
GLUCOSE UR STRIP-MCNC: (no result) MG/DL
HCT VFR BLD AUTO: 32 % (ref 39–51)
HDLC SERPL-MCNC: 29 MG/DL (ref 40–60)
HGB BLD-MCNC: 10.7 G/DL (ref 13.5–17.5)
HGB UR QL STRIP: NEGATIVE ERY/UL
KETONES UR STRIP-MCNC: NEGATIVE MG/DL
LDLC SERPL DIRECT ASSAY-MCNC: 57 MG/DL (ref 0–99)
LEUKOCYTE ESTERASE UR QL STRIP: NEGATIVE
LYMPHOCYTES NFR BLD AUTO: 0.8 K/UL (ref 0.8–4.8)
LYMPHOCYTES NFR BLD AUTO: 18.9 % (ref 20–44)
LYMPHOCYTES NFR BLD MANUAL: 15 % (ref 16–48)
MAGNESIUM SERPL-MCNC: 1.7 MG/DL (ref 1.8–2.4)
MCH RBC QN AUTO: 27 PG (ref 26–33)
MCHC RBC AUTO-ENTMCNC: 34 G/DL (ref 31–36)
MCV RBC AUTO: 81 FL (ref 80–96)
MONOCYTES NFR BLD AUTO: 0.4 K/UL (ref 0.1–1.3)
MONOCYTES NFR BLD AUTO: 8.9 % (ref 2–12)
MONOCYTES NFR BLD MANUAL: 10 % (ref 0–11)
NEUTROPHILS # BLD AUTO: 2.8 K/UL (ref 1.8–8.9)
NEUTROPHILS NFR BLD AUTO: 69.7 % (ref 43–81)
NEUTS SEG NFR BLD MANUAL: 73 % (ref 42–76)
NITRITE UR QL STRIP: NEGATIVE
PH UR STRIP: 6 [PH] (ref 5–8)
PHOSPHATE SERPL-MCNC: 3.3 MG/DL (ref 2.5–4.9)
PLATELET # BLD AUTO: 92 K/UL (ref 150–450)
PLATELET BLD QL SMEAR: (no result)
POTASSIUM SERPL-SCNC: 4.4 MMOL/L (ref 3.5–5.1)
PROT SERPL-MCNC: 7.1 G/DL (ref 6.4–8.2)
PROT UR QL STRIP: NEGATIVE MG/DL
PROT UR-MCNC: 28 MG/DL (ref 0–11.9)
RBC # BLD AUTO: 3.95 MIL/UL (ref 4.5–6)
RBC #/AREA URNS HPF: (no result) /HPF (ref 0–2)
SODIUM SERPL-SCNC: 138 MMOL/L (ref 136–145)
SODIUM UR-SCNC: 132 MMOL/L (ref 40–220)
SP GR UR STRIP: 1.02 (ref 1–1.03)
TRIGL SERPL-MCNC: 242 MG/DL (ref 30–150)
TSH SERPL DL<=0.005 MIU/L-ACNC: 3.8 UIU/ML (ref 0.36–3.74)
UROBILINOGEN UR STRIP-MCNC: 4 EU/DL
VIT B12 SERPL-MCNC: 270 PG/ML (ref 193–986)
WBC #/AREA URNS HPF: (no result) /HPF (ref 0–3)
WBC NRBC COR # BLD AUTO: 4 K/UL (ref 4.3–11)

## 2024-03-20 RX ADMIN — CARVEDILOL SCH MG: 12.5 TABLET, FILM COATED ORAL at 09:00

## 2024-03-20 RX ADMIN — Medication SCH MG: at 18:17

## 2024-03-20 RX ADMIN — Medication SCH MG: at 09:00

## 2024-03-20 RX ADMIN — FLUTICASONE FUROATE AND VILANTEROL TRIFENATATE SCH EACH: 100; 25 POWDER RESPIRATORY (INHALATION) at 15:33

## 2024-03-20 RX ADMIN — LACTULOSE SCH G: 10 SOLUTION ORAL at 09:00

## 2024-03-20 RX ADMIN — PANTOPRAZOLE SODIUM SCH MG: 40 TABLET, DELAYED RELEASE ORAL at 09:00

## 2024-03-20 RX ADMIN — LOSARTAN POTASSIUM SCH MG: 50 TABLET, FILM COATED ORAL at 09:00

## 2024-03-20 RX ADMIN — CLOPIDOGREL BISULFATE SCH MG: 75 TABLET, FILM COATED ORAL at 09:00

## 2024-03-20 RX ADMIN — INSULIN HUMAN PRN UNITS: 100 INJECTION, SOLUTION PARENTERAL at 21:58

## 2024-03-20 RX ADMIN — SPIRONOLACTONE SCH MG: 25 TABLET, FILM COATED ORAL at 18:17

## 2024-03-20 RX ADMIN — FERROUS SULFATE TAB 325 MG (65 MG ELEMENTAL FE) SCH MG: 325 (65 FE) TAB at 09:00

## 2024-03-20 RX ADMIN — FUROSEMIDE SCH MG: 40 TABLET ORAL at 18:16

## 2024-03-20 RX ADMIN — FENOFIBRATE SCH MG: 145 TABLET ORAL at 09:00

## 2024-03-20 RX ADMIN — RIFAXIMIN SCH MG: 550 TABLET ORAL at 09:00

## 2024-03-20 RX ADMIN — Medication SCH MG: at 21:41

## 2024-03-20 RX ADMIN — MAGNESIUM OXIDE TAB 400 MG (241.3 MG ELEMENTAL MG) ONE MG: 400 (241.3 MG) TAB at 10:23

## 2024-03-21 VITALS — OXYGEN SATURATION: 96 % | TEMPERATURE: 98.3 F | SYSTOLIC BLOOD PRESSURE: 122 MMHG | DIASTOLIC BLOOD PRESSURE: 67 MMHG

## 2024-03-21 VITALS — OXYGEN SATURATION: 98 % | SYSTOLIC BLOOD PRESSURE: 141 MMHG | DIASTOLIC BLOOD PRESSURE: 67 MMHG | TEMPERATURE: 98.9 F

## 2024-03-21 VITALS — DIASTOLIC BLOOD PRESSURE: 73 MMHG | SYSTOLIC BLOOD PRESSURE: 160 MMHG | OXYGEN SATURATION: 92 % | TEMPERATURE: 99.9 F

## 2024-03-21 VITALS — TEMPERATURE: 97.8 F | DIASTOLIC BLOOD PRESSURE: 90 MMHG | SYSTOLIC BLOOD PRESSURE: 137 MMHG | OXYGEN SATURATION: 91 %

## 2024-03-21 VITALS — TEMPERATURE: 97.3 F | OXYGEN SATURATION: 92 % | DIASTOLIC BLOOD PRESSURE: 81 MMHG | SYSTOLIC BLOOD PRESSURE: 151 MMHG

## 2024-03-21 VITALS — SYSTOLIC BLOOD PRESSURE: 137 MMHG | DIASTOLIC BLOOD PRESSURE: 65 MMHG | OXYGEN SATURATION: 98 % | TEMPERATURE: 98 F

## 2024-03-21 LAB
BUN SERPL-MCNC: 20 MG/DL (ref 7–18)
CALCIUM SERPL-MCNC: 9.2 MG/DL (ref 8.5–10.1)
CHLORIDE SERPL-SCNC: 101 MMOL/L (ref 98–107)
CO2 SERPL-SCNC: 23 MMOL/L (ref 21–32)
CREAT SERPL-MCNC: 1.2 MG/DL (ref 0.6–1.3)
FERRITIN SERPL-MCNC: 62 NG/ML (ref 8–388)
FOLATE SERPL-MCNC: 8.5 NG/ML (ref 3–?)
GLUCOSE SERPL-MCNC: 130 MG/DL (ref 74–106)
IRON SERPL-MCNC: 29 UG/DL (ref 50–175)
POTASSIUM SERPL-SCNC: 3.8 MMOL/L (ref 3.5–5.1)
SODIUM SERPL-SCNC: 136 MMOL/L (ref 136–145)
TIBC SERPL-MCNC: 434 UG/DL (ref 250–450)

## 2024-03-21 RX ADMIN — FUROSEMIDE SCH MG: 10 INJECTION INTRAVENOUS at 18:10

## 2024-03-21 RX ADMIN — CARVEDILOL SCH MG: 6.25 TABLET, FILM COATED ORAL at 17:03

## 2024-03-22 VITALS — DIASTOLIC BLOOD PRESSURE: 85 MMHG | SYSTOLIC BLOOD PRESSURE: 122 MMHG | OXYGEN SATURATION: 96 % | TEMPERATURE: 98.9 F

## 2024-03-22 VITALS — SYSTOLIC BLOOD PRESSURE: 122 MMHG | DIASTOLIC BLOOD PRESSURE: 67 MMHG | TEMPERATURE: 98.3 F | OXYGEN SATURATION: 96 %

## 2024-03-22 VITALS — OXYGEN SATURATION: 96 % | DIASTOLIC BLOOD PRESSURE: 89 MMHG | TEMPERATURE: 98.9 F | SYSTOLIC BLOOD PRESSURE: 140 MMHG

## 2024-03-22 VITALS — TEMPERATURE: 98.3 F | DIASTOLIC BLOOD PRESSURE: 73 MMHG | SYSTOLIC BLOOD PRESSURE: 148 MMHG | OXYGEN SATURATION: 96 %

## 2024-03-22 VITALS — SYSTOLIC BLOOD PRESSURE: 130 MMHG | OXYGEN SATURATION: 98 % | DIASTOLIC BLOOD PRESSURE: 82 MMHG | TEMPERATURE: 98.5 F

## 2024-03-22 LAB
BUN SERPL-MCNC: 25 MG/DL (ref 7–18)
CALCIUM SERPL-MCNC: 9.2 MG/DL (ref 8.5–10.1)
CHLORIDE SERPL-SCNC: 99 MMOL/L (ref 98–107)
CO2 SERPL-SCNC: 26 MMOL/L (ref 21–32)
CREAT SERPL-MCNC: 1.6 MG/DL (ref 0.6–1.3)
GLUCOSE SERPL-MCNC: 113 MG/DL (ref 74–106)
POTASSIUM SERPL-SCNC: 3.8 MMOL/L (ref 3.5–5.1)
SODIUM SERPL-SCNC: 136 MMOL/L (ref 136–145)

## 2024-07-30 ENCOUNTER — OFFICE (OUTPATIENT)
Dept: URBAN - METROPOLITAN AREA CLINIC 45 | Facility: CLINIC | Age: 74
End: 2024-07-30

## 2024-07-30 VITALS — WEIGHT: 180 LBS | HEIGHT: 65 IN

## 2024-07-30 DIAGNOSIS — I85.00: ICD-10-CM

## 2024-07-30 DIAGNOSIS — K70.30: ICD-10-CM

## 2024-07-30 PROCEDURE — 99215 OFFICE O/P EST HI 40 MIN: CPT | Mod: 95 | Performed by: INTERNAL MEDICINE

## 2024-07-30 NOTE — SERVICEHPINOTES
The patient is scheduled today for  telehealth visit.The clinician is connected to a secure network. The patient consents to this teleconference being a billable service. This visit used D[employer] for a live, interactive audio and video telehealth visit.   The patient returns for routine follow-up of liver cirrhosis and hepatic encephalopathy. He has been having recent periods of mental confusion experienced while not taking Xifaxan. Unfortunately, patient has not been able to tolerate lactulose alternative to diarrhea. He has been overdue for HCC surveillance following previous hospitalization to [facility] for encephalopathy. Prior abdominal ultrasound done in 9/23 showed no change from prior studies. Labs showed mild anemia which has been chronic. There has been a previous EGD with colon screening none by me in 10/2019. Colonoscopy was partially limited by suboptimal bowel preparation. EGD showed small esophageal varices, portal hypertensive gastropathy with benign-looking polyps.

## 2024-07-30 NOTE — SERVICENOTES
40 minutes were spent in dedicated E/M time during the date of service, including preparation of the medical chart, review of previous information, data analysis/discussion, and/or discussion with the patient/family

## 2024-10-31 ENCOUNTER — OFFICE (OUTPATIENT)
Dept: URBAN - METROPOLITAN AREA CLINIC 45 | Facility: CLINIC | Age: 74
End: 2024-10-31

## 2024-10-31 VITALS
WEIGHT: 162 LBS | HEART RATE: 61 BPM | DIASTOLIC BLOOD PRESSURE: 68 MMHG | HEIGHT: 65 IN | SYSTOLIC BLOOD PRESSURE: 134 MMHG

## 2024-10-31 DIAGNOSIS — D62: ICD-10-CM

## 2024-10-31 DIAGNOSIS — K70.30: ICD-10-CM

## 2024-10-31 DIAGNOSIS — I85.01: ICD-10-CM

## 2024-10-31 DIAGNOSIS — D63.8 ANEMIA, CHRONIC ILLNESS: ICD-10-CM

## 2024-10-31 DIAGNOSIS — I67.89: ICD-10-CM

## 2024-10-31 PROCEDURE — 99215 OFFICE O/P EST HI 40 MIN: CPT | Performed by: INTERNAL MEDICINE

## 2024-10-31 PROCEDURE — G2211 COMPLEX E/M VISIT ADD ON: HCPCS | Performed by: INTERNAL MEDICINE

## 2024-10-31 NOTE — SERVICEHPINOTES
The patient returns for office follow-up of liver cirrhosis and hepatic encephalopathy. Patient was hospitalized to [facility] in [place] several month ago following  an episode of hematemesis.  He reportedly underwent EGD with banding of esophageal varices and has not had anymore recurrences of bleeding.  Meanwhile, he was recently readmitted to the local hospital for respiratory problems and generalized weakness.  Abdominal ultrasound initially demonstrated perihepatic ascites, however, no paracentesis was done since not enough fluid was present.  Meanwhile, patient has lost about 20 pounds of weight since last visit in July of this year. He has been having frequent periods of mental confusion despite taking Xifaxan. Unfortunately, patient has not been able to tolerate lactulose alternative to diarrhea. There has been an earlier EGD with colon screening none by me in 10/2019. Colonoscopy was partially limited by suboptimal bowel preparation. EGD showed small esophageal varices, portal hypertensive gastropathy with benign-looking polyps.

## 2024-11-15 LAB
ALPHA FETOPROTEIN, TUMOR MARKER: 2 NG/ML (ref ?–6.1)
AMMONIA (P): 67 UMOL/L (ref ?–72)
CBC (H/H, RBC, INDICES, WBC, PLT): HEMATOCRIT: 37.9 % — LOW (ref 38.5–50)
CBC (H/H, RBC, INDICES, WBC, PLT): HEMOGLOBIN: 11.8 G/DL — LOW (ref 13.2–17.1)
CBC (H/H, RBC, INDICES, WBC, PLT): MCH: 25.3 PG — LOW (ref 27–33)
CBC (H/H, RBC, INDICES, WBC, PLT): MCHC: 31.1 G/DL — LOW (ref 32–36)
CBC (H/H, RBC, INDICES, WBC, PLT): MCV: 81.2 FL (ref 80–100)
CBC (H/H, RBC, INDICES, WBC, PLT): MPV: 12 FL (ref 7.5–12.5)
CBC (H/H, RBC, INDICES, WBC, PLT): PLATELET COUNT: 90 THOUSAND/UL — LOW (ref 140–400)
CBC (H/H, RBC, INDICES, WBC, PLT): RDW: 15.1 % — HIGH (ref 11–15)
CBC (H/H, RBC, INDICES, WBC, PLT): RED BLOOD CELL COUNT: 4.67 MILLION/UL (ref 4.2–5.8)
CBC (H/H, RBC, INDICES, WBC, PLT): WHITE BLOOD CELL COUNT: 4.2 THOUSAND/UL (ref 3.8–10.8)
COMPREHENSIVE METABOLIC PANEL: ALBUMIN/GLOBULIN RATIO: 1.5 (CALC) (ref 1–2.5)
COMPREHENSIVE METABOLIC PANEL: ALBUMIN: 4.5 G/DL (ref 3.6–5.1)
COMPREHENSIVE METABOLIC PANEL: ALKALINE PHOSPHATASE: 180 U/L — HIGH (ref 35–144)
COMPREHENSIVE METABOLIC PANEL: ALT: 31 U/L (ref 9–46)
COMPREHENSIVE METABOLIC PANEL: AST: 30 U/L (ref 10–35)
COMPREHENSIVE METABOLIC PANEL: BILIRUBIN, TOTAL: 0.8 MG/DL (ref 0.2–1.2)
COMPREHENSIVE METABOLIC PANEL: BUN/CREATININE RATIO: (no result) (CALC)
COMPREHENSIVE METABOLIC PANEL: CALCIUM: 9.5 MG/DL (ref 8.6–10.3)
COMPREHENSIVE METABOLIC PANEL: CARBON DIOXIDE: 27 MMOL/L (ref 20–32)
COMPREHENSIVE METABOLIC PANEL: CHLORIDE: 103 MMOL/L (ref 98–110)
COMPREHENSIVE METABOLIC PANEL: CREATININE: 1.12 MG/DL (ref 0.7–1.28)
COMPREHENSIVE METABOLIC PANEL: EGFR: 69 ML/MIN/1.73M2 (ref 60–?)
COMPREHENSIVE METABOLIC PANEL: GLOBULIN: 3.1 G/DL (CALC) (ref 1.9–3.7)
COMPREHENSIVE METABOLIC PANEL: GLUCOSE: 176 MG/DL — HIGH (ref 65–139)
COMPREHENSIVE METABOLIC PANEL: POTASSIUM: 4.4 MMOL/L (ref 3.5–5.3)
COMPREHENSIVE METABOLIC PANEL: PROTEIN, TOTAL: 7.6 G/DL (ref 6.1–8.1)
COMPREHENSIVE METABOLIC PANEL: SODIUM: 138 MMOL/L (ref 135–146)
COMPREHENSIVE METABOLIC PANEL: UREA NITROGEN (BUN): 21 MG/DL (ref 7–25)
FERRITIN: 39 NG/ML (ref 24–380)
IRON AND TOTAL IRON BINDING CAPACITY: % SATURATION: 14 % (CALC) — LOW (ref 20–48)
IRON AND TOTAL IRON BINDING CAPACITY: IRON BINDING CAPACITY: 361 MCG/DL (CALC) (ref 250–425)
IRON AND TOTAL IRON BINDING CAPACITY: IRON, TOTAL: 52 MCG/DL (ref 50–180)
VITAMIN B12: 322 PG/ML (ref 200–1100)

## 2024-12-02 ENCOUNTER — OFFICE (OUTPATIENT)
Dept: URBAN - METROPOLITAN AREA CLINIC 45 | Facility: CLINIC | Age: 74
End: 2024-12-02

## 2024-12-02 VITALS — HEIGHT: 65 IN

## 2024-12-02 DIAGNOSIS — I85.01: ICD-10-CM

## 2024-12-02 DIAGNOSIS — K70.30: ICD-10-CM

## 2024-12-02 DIAGNOSIS — D62: ICD-10-CM

## 2024-12-02 DIAGNOSIS — D63.8 ANEMIA, CHRONIC ILLNESS: ICD-10-CM

## 2024-12-02 PROCEDURE — 99215 OFFICE O/P EST HI 40 MIN: CPT | Mod: 95 | Performed by: INTERNAL MEDICINE

## 2024-12-02 NOTE — SERVICEHPINOTES
The patient is scheduled today for telehealth visit. The clinician is connected to a secure network. The patient consents to this teleconference being a billable service. This visit used Doxy for a live, interactive audio and video telehealth visit.   The patient returns for follow-up of mixed chronic disease and iron deficiency anemia in the setting of liver cirrhosis and hepatic encephalopathy. He reports no new symptoms or complaints, continues to take propranolol, PPI and Xifaxan to prevent recurrent variceal bleeding and encephalopathy. He reportedly underwent EGD with banding of esophageal varices during hospitalization at [facility] in June of this year and has not had recurrence of bleeding. Fairly recent abdominal ultrasound in 9/2024 showed no evidence of ascites or hepatic masses. Patient has lost about 20 pounds of weight since last year. There has been an earlier EGD with colon screening none by me in 10/2019. Colonoscopy was partially limited by suboptimal bowel preparation. EGD showed small esophageal varices, portal hypertensive gastropathy with benign-looking polyps.

## 2025-02-12 ENCOUNTER — HOSPITAL ENCOUNTER (INPATIENT)
Dept: HOSPITAL 54 - ER | Age: 75
LOS: 3 days | Discharge: HOSPICE | DRG: 392 | End: 2025-02-15
Attending: NURSE PRACTITIONER | Admitting: INTERNAL MEDICINE
Payer: MEDICARE

## 2025-02-12 VITALS — HEIGHT: 71 IN | BODY MASS INDEX: 21.6 KG/M2 | WEIGHT: 154.31 LBS

## 2025-02-12 DIAGNOSIS — Z91.81: ICD-10-CM

## 2025-02-12 DIAGNOSIS — B37.49: ICD-10-CM

## 2025-02-12 DIAGNOSIS — Z98.890: ICD-10-CM

## 2025-02-12 DIAGNOSIS — Z87.19: ICD-10-CM

## 2025-02-12 DIAGNOSIS — I50.9: ICD-10-CM

## 2025-02-12 DIAGNOSIS — Z98.1: ICD-10-CM
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
## 2025-02-12 DIAGNOSIS — E78.5: ICD-10-CM

## 2025-02-12 DIAGNOSIS — E11.22: ICD-10-CM

## 2025-02-12 DIAGNOSIS — S81.812A: ICD-10-CM

## 2025-02-12 DIAGNOSIS — Z79.82: ICD-10-CM

## 2025-02-12 DIAGNOSIS — B96.89: ICD-10-CM

## 2025-02-12 DIAGNOSIS — D50.9: ICD-10-CM

## 2025-02-12 DIAGNOSIS — E11.42: ICD-10-CM

## 2025-02-12 DIAGNOSIS — K76.6: ICD-10-CM

## 2025-02-12 DIAGNOSIS — Z86.73: ICD-10-CM

## 2025-02-12 DIAGNOSIS — Z66: ICD-10-CM

## 2025-02-12 DIAGNOSIS — Z79.02: ICD-10-CM
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
## 2025-02-12 DIAGNOSIS — I25.10: ICD-10-CM

## 2025-02-12 DIAGNOSIS — Z79.899: ICD-10-CM

## 2025-02-12 DIAGNOSIS — K31.89: Primary | ICD-10-CM

## 2025-02-12 DIAGNOSIS — K63.89: ICD-10-CM

## 2025-02-12 DIAGNOSIS — M89.8X9: ICD-10-CM

## 2025-02-12 DIAGNOSIS — Z95.5: ICD-10-CM

## 2025-02-12 DIAGNOSIS — I13.0: ICD-10-CM

## 2025-02-12 DIAGNOSIS — D69.6: ICD-10-CM

## 2025-02-12 DIAGNOSIS — N17.9: ICD-10-CM

## 2025-02-12 DIAGNOSIS — S81.811A: ICD-10-CM

## 2025-02-12 DIAGNOSIS — R33.9: ICD-10-CM

## 2025-02-12 DIAGNOSIS — N18.30: ICD-10-CM

## 2025-02-12 DIAGNOSIS — N40.0: ICD-10-CM

## 2025-02-12 DIAGNOSIS — R23.4: ICD-10-CM

## 2025-02-12 DIAGNOSIS — Z91.041: ICD-10-CM

## 2025-02-12 DIAGNOSIS — J90: ICD-10-CM

## 2025-02-12 DIAGNOSIS — Z79.84: ICD-10-CM

## 2025-02-12 DIAGNOSIS — K70.31: ICD-10-CM

## 2025-02-12 LAB
ALBUMIN SERPL BCP-MCNC: 3.1 G/DL (ref 3.4–5)
ALP SERPL-CCNC: 402 U/L (ref 46–116)
ALT SERPL W P-5'-P-CCNC: 33 U/L (ref 12–78)
APPEARANCE UR: CLEAR
AST SERPL W P-5'-P-CCNC: 31 U/L (ref 15–37)
BACTERIA UR CULT: YES
BASOPHILS # BLD AUTO: 0 K/UL (ref 0–0.2)
BASOPHILS NFR BLD AUTO: 0.2 % (ref 0–2)
BILIRUB DIRECT SERPL-MCNC: 0.6 MG/DL (ref 0–0.2)
BILIRUB SERPL-MCNC: 1 MG/DL (ref 0.2–1)
BILIRUB UR QL STRIP: NEGATIVE
BUN SERPL-MCNC: 31 MG/DL (ref 7–18)
CALCIUM SERPL-MCNC: 8.7 MG/DL (ref 8.5–10.1)
CHLORIDE SERPL-SCNC: 101 MMOL/L (ref 98–107)
CO2 SERPL-SCNC: 23 MMOL/L (ref 21–32)
COLOR UR: YELLOW
CREAT SERPL-MCNC: 1.8 MG/DL (ref 0.6–1.3)
DEPRECATED SQUAMOUS URNS QL MICRO: (no result) /HPF
EOSINOPHIL # BLD AUTO: 0.1 K/UL (ref 0–0.7)
EOSINOPHIL NFR BLD AUTO: 1.1 % (ref 0–6)
ERYTHROCYTE [DISTWIDTH] IN BLOOD BY AUTOMATED COUNT: 17.6 % (ref 11.5–15)
GLUCOSE SERPL-MCNC: 125 MG/DL (ref 74–106)
GLUCOSE UR STRIP-MCNC: NEGATIVE MG/DL
HCT VFR BLD AUTO: 34 % (ref 39–51)
HGB BLD-MCNC: 11.4 G/DL (ref 13.5–17.5)
HGB UR QL STRIP: (no result) ERY/UL
KETONES UR STRIP-MCNC: NEGATIVE MG/DL
LEUKOCYTE ESTERASE UR QL STRIP: (no result)
LIPASE SERPL-CCNC: 103 U/L (ref 16–77)
LYMPHOCYTES NFR BLD AUTO: 0.9 K/UL (ref 0.8–4.8)
LYMPHOCYTES NFR BLD AUTO: 13.3 % (ref 20–44)
MCH RBC QN AUTO: 26 PG (ref 26–33)
MCHC RBC AUTO-ENTMCNC: 33 G/DL (ref 31–36)
MCV RBC AUTO: 78 FL (ref 80–96)
MONOCYTES NFR BLD AUTO: 0.9 K/UL (ref 0.1–1.3)
MONOCYTES NFR BLD AUTO: 13 % (ref 2–12)
NEUTROPHILS # BLD AUTO: 4.9 K/UL (ref 1.8–8.9)
NEUTROPHILS NFR BLD AUTO: 72.4 % (ref 43–81)
NITRITE UR QL STRIP: NEGATIVE
PH UR STRIP: 6 [PH] (ref 5–8)
PLATELET # BLD AUTO: 102 K/UL (ref 150–450)
POTASSIUM SERPL-SCNC: 4.4 MMOL/L (ref 3.5–5.1)
PROT SERPL-MCNC: 7.6 G/DL (ref 6.4–8.2)
PROT UR QL STRIP: (no result) MG/DL
RBC # BLD AUTO: 4.39 MIL/UL (ref 4.5–6)
RBC #/AREA URNS HPF: (no result) /HPF (ref 0–2)
SODIUM SERPL-SCNC: 136 MMOL/L (ref 136–145)
SP GR UR STRIP: 1.02 (ref 1–1.03)
UROBILINOGEN UR STRIP-MCNC: 2 EU/DL
WBC NRBC COR # BLD AUTO: 6.8 K/UL (ref 4.3–11)

## 2025-02-12 PROCEDURE — A4216 STERILE WATER/SALINE, 10 ML: HCPCS

## 2025-02-12 PROCEDURE — G0378 HOSPITAL OBSERVATION PER HR: HCPCS

## 2025-02-12 PROCEDURE — A4223 INFUSION SUPPLIES W/O PUMP: HCPCS

## 2025-02-12 RX ADMIN — CEFTRIAXONE ONE GM: 1 INJECTION, SOLUTION INTRAVENOUS at 22:58

## 2025-02-13 VITALS — DIASTOLIC BLOOD PRESSURE: 87 MMHG | SYSTOLIC BLOOD PRESSURE: 145 MMHG | OXYGEN SATURATION: 98 % | TEMPERATURE: 98.8 F

## 2025-02-13 VITALS — TEMPERATURE: 98 F | SYSTOLIC BLOOD PRESSURE: 120 MMHG | DIASTOLIC BLOOD PRESSURE: 76 MMHG | OXYGEN SATURATION: 97 %

## 2025-02-13 VITALS — OXYGEN SATURATION: 97 % | DIASTOLIC BLOOD PRESSURE: 85 MMHG | TEMPERATURE: 98.2 F | SYSTOLIC BLOOD PRESSURE: 145 MMHG

## 2025-02-13 LAB
APPEARANCE SPUN FLD: CLEAR
APTT PPP: 31.3 SEC (ref 24.3–34.3)
BASOPHILS # BLD AUTO: 0 K/UL (ref 0–0.2)
BASOPHILS NFR BLD AUTO: 0.3 % (ref 0–2)
BUN SERPL-MCNC: 28 MG/DL (ref 7–18)
CALCIUM SERPL-MCNC: 9.3 MG/DL (ref 8.5–10.1)
CHLORIDE SERPL-SCNC: 101 MMOL/L (ref 98–107)
CO2 SERPL-SCNC: 25 MMOL/L (ref 21–32)
CREAT SERPL-MCNC: 1.6 MG/DL (ref 0.6–1.3)
DEPRECATED POLYS # FLD: 6 % (ref 0–25)
EOSINOPHIL # BLD AUTO: 0.1 K/UL (ref 0–0.7)
EOSINOPHIL NFR BLD AUTO: 1.1 % (ref 0–6)
ERYTHROCYTE [DISTWIDTH] IN BLOOD BY AUTOMATED COUNT: 17.7 % (ref 11.5–15)
GLUCOSE SERPL-MCNC: 143 MG/DL (ref 74–106)
HCT VFR BLD AUTO: 34 % (ref 39–51)
HGB BLD-MCNC: 11.6 G/DL (ref 13.5–17.5)
INR PPP: 1.22 (ref 0.91–1.1)
LYMPHOCYTES NFR BLD AUTO: 0.6 K/UL (ref 0.8–4.8)
LYMPHOCYTES NFR BLD AUTO: 12.3 % (ref 20–44)
MACROPHAGES # FLD: 11 10*3/UL
MCH RBC QN AUTO: 27 PG (ref 26–33)
MCHC RBC AUTO-ENTMCNC: 35 G/DL (ref 31–36)
MCV RBC AUTO: 77 FL (ref 80–96)
MONOCYTES # FLD: 2 %
MONOCYTES NFR BLD AUTO: 0.6 K/UL (ref 0.1–1.3)
MONOCYTES NFR BLD AUTO: 11.1 % (ref 2–12)
NEUTROPHILS # BLD AUTO: 4 K/UL (ref 1.8–8.9)
NEUTROPHILS NFR BLD AUTO: 75.2 % (ref 43–81)
PLATELET # BLD AUTO: 109 K/UL (ref 150–450)
POTASSIUM SERPL-SCNC: 3.9 MMOL/L (ref 3.5–5.1)
PROT FLD-MCNC: 3.3 G/DL
PROTHROMBIN TIME: 12.8 SECS (ref 9.2–11.1)
RBC # BLD AUTO: 4.35 MIL/UL (ref 4.5–6)
SODIUM SERPL-SCNC: 135 MMOL/L (ref 136–145)
SPECIMEN VOL FLD: 6000 ML
WBC # FLD: 272 /CU. MM. (ref 0–200)
WBC NRBC COR # BLD AUTO: 5.3 K/UL (ref 4.3–11)

## 2025-02-13 PROCEDURE — 0W9G3ZX DRAINAGE OF PERITONEAL CAVITY, PERCUTANEOUS APPROACH, DIAGNOSTIC: ICD-10-PCS

## 2025-02-13 RX ADMIN — RIFAXIMIN SCH MG: 550 TABLET ORAL at 08:35

## 2025-02-13 RX ADMIN — Medication SCH EACH: at 08:28

## 2025-02-13 RX ADMIN — FUROSEMIDE SCH MG: 40 TABLET ORAL at 08:35

## 2025-02-13 RX ADMIN — INSULIN HUMAN PRN UNITS: 100 INJECTION, SOLUTION PARENTERAL at 21:29

## 2025-02-13 RX ADMIN — CLOPIDOGREL BISULFATE SCH MG: 75 TABLET, FILM COATED ORAL at 08:35

## 2025-02-13 RX ADMIN — CARVEDILOL SCH MG: 12.5 TABLET, FILM COATED ORAL at 08:35

## 2025-02-13 RX ADMIN — ATORVASTATIN CALCIUM SCH MG: 40 TABLET, FILM COATED ORAL at 21:17

## 2025-02-13 RX ADMIN — Medication SCH MLS/HR: at 01:09

## 2025-02-13 RX ADMIN — FERROUS SULFATE TAB 325 MG (65 MG ELEMENTAL FE) SCH MG: 325 (65 FE) TAB at 08:35

## 2025-02-13 RX ADMIN — PANTOPRAZOLE SODIUM SCH MG: 40 TABLET, DELAYED RELEASE ORAL at 08:35

## 2025-02-13 RX ADMIN — DEXTROSE MONOHYDRATE SCH MLS/HR: 50 INJECTION, SOLUTION INTRAVENOUS at 21:15

## 2025-02-13 RX ADMIN — Medication SCH MLS/HR: at 15:07

## 2025-02-14 VITALS — OXYGEN SATURATION: 99 % | SYSTOLIC BLOOD PRESSURE: 133 MMHG | DIASTOLIC BLOOD PRESSURE: 89 MMHG | TEMPERATURE: 98.8 F

## 2025-02-14 VITALS — TEMPERATURE: 98.4 F | OXYGEN SATURATION: 100 % | DIASTOLIC BLOOD PRESSURE: 88 MMHG | SYSTOLIC BLOOD PRESSURE: 142 MMHG

## 2025-02-14 VITALS — TEMPERATURE: 98.4 F | OXYGEN SATURATION: 97 % | SYSTOLIC BLOOD PRESSURE: 155 MMHG | DIASTOLIC BLOOD PRESSURE: 86 MMHG

## 2025-02-14 VITALS — TEMPERATURE: 98.6 F | SYSTOLIC BLOOD PRESSURE: 145 MMHG | DIASTOLIC BLOOD PRESSURE: 78 MMHG | OXYGEN SATURATION: 97 %

## 2025-02-14 LAB
ALBUMIN SERPL BCP-MCNC: 2.7 G/DL (ref 3.4–5)
ALP SERPL-CCNC: 370 U/L (ref 46–116)
ALT SERPL W P-5'-P-CCNC: 32 U/L (ref 12–78)
AST SERPL W P-5'-P-CCNC: 30 U/L (ref 15–37)
BASOPHILS # BLD AUTO: 0 K/UL (ref 0–0.2)
BASOPHILS NFR BLD AUTO: 0.3 % (ref 0–2)
BILIRUB SERPL-MCNC: 0.7 MG/DL (ref 0.2–1)
BUN SERPL-MCNC: 30 MG/DL (ref 7–18)
CALCIUM SERPL-MCNC: 8.7 MG/DL (ref 8.5–10.1)
CHLORIDE SERPL-SCNC: 101 MMOL/L (ref 98–107)
CK SERPL-CCNC: 32 U/L (ref 39–308)
CO2 SERPL-SCNC: 25 MMOL/L (ref 21–32)
CREAT SERPL-MCNC: 1.7 MG/DL (ref 0.6–1.3)
EOSINOPHIL # BLD AUTO: 0.1 K/UL (ref 0–0.7)
EOSINOPHIL NFR BLD AUTO: 1.2 % (ref 0–6)
ERYTHROCYTE [DISTWIDTH] IN BLOOD BY AUTOMATED COUNT: 17.5 % (ref 11.5–15)
GLUCOSE SERPL-MCNC: 138 MG/DL (ref 74–106)
HCT VFR BLD AUTO: 32 % (ref 39–51)
HGB BLD-MCNC: 11 G/DL (ref 13.5–17.5)
LYMPHOCYTES NFR BLD AUTO: 0.5 K/UL (ref 0.8–4.8)
LYMPHOCYTES NFR BLD AUTO: 10.7 % (ref 20–44)
MAGNESIUM SERPL-MCNC: 1.6 MG/DL (ref 1.8–2.4)
MCH RBC QN AUTO: 26 PG (ref 26–33)
MCHC RBC AUTO-ENTMCNC: 34 G/DL (ref 31–36)
MCV RBC AUTO: 77 FL (ref 80–96)
MONOCYTES NFR BLD AUTO: 0.6 K/UL (ref 0.1–1.3)
MONOCYTES NFR BLD AUTO: 12.2 % (ref 2–12)
NEUTROPHILS # BLD AUTO: 3.7 K/UL (ref 1.8–8.9)
NEUTROPHILS NFR BLD AUTO: 75.6 % (ref 43–81)
PHOSPHATE SERPL-MCNC: 4 MG/DL (ref 2.5–4.9)
PLATELET # BLD AUTO: 101 K/UL (ref 150–450)
POTASSIUM SERPL-SCNC: 3.9 MMOL/L (ref 3.5–5.1)
PROT SERPL-MCNC: 6.9 G/DL (ref 6.4–8.2)
RBC # BLD AUTO: 4.21 MIL/UL (ref 4.5–6)
SODIUM SERPL-SCNC: 136 MMOL/L (ref 136–145)
WBC NRBC COR # BLD AUTO: 5 K/UL (ref 4.3–11)

## 2025-02-14 RX ADMIN — MAGNESIUM OXIDE TAB 400 MG (241.3 MG ELEMENTAL MG) ONE MG: 400 (241.3 MG) TAB at 12:21

## 2025-02-14 RX ADMIN — FLUCONAZOLE SCH MG: 100 TABLET ORAL at 08:31

## 2025-02-15 VITALS — SYSTOLIC BLOOD PRESSURE: 114 MMHG | DIASTOLIC BLOOD PRESSURE: 63 MMHG | OXYGEN SATURATION: 97 % | TEMPERATURE: 98.1 F

## 2025-02-15 VITALS — SYSTOLIC BLOOD PRESSURE: 128 MMHG | TEMPERATURE: 97.7 F | DIASTOLIC BLOOD PRESSURE: 79 MMHG | OXYGEN SATURATION: 96 %

## 2025-02-15 VITALS — TEMPERATURE: 97.9 F | DIASTOLIC BLOOD PRESSURE: 89 MMHG | SYSTOLIC BLOOD PRESSURE: 132 MMHG | OXYGEN SATURATION: 95 %

## 2025-02-15 VITALS — TEMPERATURE: 97.7 F | SYSTOLIC BLOOD PRESSURE: 128 MMHG | DIASTOLIC BLOOD PRESSURE: 79 MMHG | OXYGEN SATURATION: 96 %

## 2025-02-15 LAB
BUN SERPL-MCNC: 32 MG/DL (ref 7–18)
CALCIUM SERPL-MCNC: 8.8 MG/DL (ref 8.5–10.1)
CHLORIDE SERPL-SCNC: 100 MMOL/L (ref 98–107)
CO2 SERPL-SCNC: 25 MMOL/L (ref 21–32)
CREAT SERPL-MCNC: 1.7 MG/DL (ref 0.6–1.3)
GLUCOSE SERPL-MCNC: 122 MG/DL (ref 74–106)
MAGNESIUM SERPL-MCNC: 1.8 MG/DL (ref 1.8–2.4)
POTASSIUM SERPL-SCNC: 3.9 MMOL/L (ref 3.5–5.1)
PTH-INTACT SERPL-MCNC: 21 PG/ML (ref 15–65)
SODIUM SERPL-SCNC: 135 MMOL/L (ref 136–145)

## 2025-02-15 RX ADMIN — FENOFIBRATE SCH MG: 145 TABLET ORAL at 08:28

## 2025-02-17 LAB
ALBUMIN SERPL ELPH-MCNC: 2.8 G/DL (ref 2.9–4.4)
ALBUMIN/GLOB SERPL: 0.8 {RATIO} (ref 0.7–1.7)
ALPHA1 GLOB SERPL ELPH-MCNC: 0.4 G/DL (ref 0–0.4)
ALPHA2 GLOB SERPL ELPH-MCNC: 1 G/DL (ref 0.4–1)
B-GLOBULIN SERPL ELPH-MCNC: 1.1 G/DL (ref 0.7–1.3)
GAMMA GLOB SERPL ELPH-MCNC: 1.3 G/DL (ref 0.4–1.8)
GLOBULIN SER CALC-MCNC: 3.7 G/DL (ref 2.2–3.9)
PROT SERPL-MCNC: 6.5 G/DL (ref 6–8.5)

## 2025-03-01 ENCOUNTER — HOSPITAL ENCOUNTER (INPATIENT)
Dept: HOSPITAL 54 - ER | Age: 75
LOS: 5 days | Discharge: NURSING, REHAB OR LONG-TERM CARE | DRG: 699 | End: 2025-03-06
Attending: NURSE PRACTITIONER
Payer: MEDICARE

## 2025-03-01 VITALS — SYSTOLIC BLOOD PRESSURE: 139 MMHG | DIASTOLIC BLOOD PRESSURE: 82 MMHG | TEMPERATURE: 98.1 F | OXYGEN SATURATION: 98 %

## 2025-03-01 VITALS — WEIGHT: 155 LBS | BODY MASS INDEX: 25.83 KG/M2 | HEIGHT: 65 IN

## 2025-03-01 DIAGNOSIS — B96.89: ICD-10-CM

## 2025-03-01 DIAGNOSIS — I13.10: ICD-10-CM

## 2025-03-01 DIAGNOSIS — M25.511: ICD-10-CM

## 2025-03-01 DIAGNOSIS — E11.22: ICD-10-CM

## 2025-03-01 DIAGNOSIS — M89.8X9: ICD-10-CM

## 2025-03-01 DIAGNOSIS — R18.8: ICD-10-CM
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
## 2025-03-01 DIAGNOSIS — N18.4: ICD-10-CM

## 2025-03-01 DIAGNOSIS — K74.60: ICD-10-CM

## 2025-03-01 DIAGNOSIS — Z79.899: ICD-10-CM

## 2025-03-01 DIAGNOSIS — Z79.02: ICD-10-CM

## 2025-03-01 DIAGNOSIS — Z91.041: ICD-10-CM

## 2025-03-01 DIAGNOSIS — B37.49: ICD-10-CM

## 2025-03-01 DIAGNOSIS — K21.9: ICD-10-CM

## 2025-03-01 DIAGNOSIS — E44.0: ICD-10-CM

## 2025-03-01 DIAGNOSIS — R33.9: ICD-10-CM

## 2025-03-01 DIAGNOSIS — D50.9: ICD-10-CM

## 2025-03-01 DIAGNOSIS — S09.90XA: ICD-10-CM

## 2025-03-01 DIAGNOSIS — D63.8: ICD-10-CM

## 2025-03-01 DIAGNOSIS — Z87.440: ICD-10-CM

## 2025-03-01 DIAGNOSIS — N17.9: ICD-10-CM

## 2025-03-01 DIAGNOSIS — Z95.5: ICD-10-CM

## 2025-03-01 DIAGNOSIS — E87.1: ICD-10-CM

## 2025-03-01 DIAGNOSIS — Z79.4: ICD-10-CM

## 2025-03-01 DIAGNOSIS — T83.518A: Primary | ICD-10-CM
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
## 2025-03-01 DIAGNOSIS — M19.90: ICD-10-CM
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
## 2025-03-01 DIAGNOSIS — Z79.84: ICD-10-CM

## 2025-03-01 DIAGNOSIS — Z98.890: ICD-10-CM

## 2025-03-01 DIAGNOSIS — Z86.73: ICD-10-CM

## 2025-03-01 DIAGNOSIS — I25.10: ICD-10-CM

## 2025-03-01 DIAGNOSIS — E72.20: ICD-10-CM

## 2025-03-01 DIAGNOSIS — Z98.1: ICD-10-CM
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
## 2025-03-01 DIAGNOSIS — E88.09: ICD-10-CM

## 2025-03-01 LAB
ALBUMIN SERPL BCP-MCNC: 2.4 G/DL (ref 3.4–5)
ALP SERPL-CCNC: 554 U/L (ref 46–116)
ALT SERPL W P-5'-P-CCNC: 62 U/L (ref 12–78)
APPEARANCE UR: (no result)
APTT PPP: 30.3 SEC (ref 24.3–34.3)
AST SERPL W P-5'-P-CCNC: 68 U/L (ref 15–37)
BACTERIA UR CULT: YES
BASOPHILS # BLD AUTO: 0 K/UL (ref 0–0.2)
BASOPHILS NFR BLD AUTO: 0.3 % (ref 0–2)
BILIRUB DIRECT SERPL-MCNC: 0.4 MG/DL (ref 0–0.2)
BILIRUB SERPL-MCNC: 0.6 MG/DL (ref 0.2–1)
BILIRUB UR QL STRIP: NEGATIVE
BUN SERPL-MCNC: 34 MG/DL (ref 7–18)
CALCIUM SERPL-MCNC: 8.3 MG/DL (ref 8.5–10.1)
CHLORIDE SERPL-SCNC: 101 MMOL/L (ref 98–107)
CO2 SERPL-SCNC: 23 MMOL/L (ref 21–32)
COLOR UR: YELLOW
CREAT SERPL-MCNC: 1.5 MG/DL (ref 0.6–1.3)
EOSINOPHIL # BLD AUTO: 0.1 K/UL (ref 0–0.7)
EOSINOPHIL NFR BLD AUTO: 1.5 % (ref 0–6)
ERYTHROCYTE [DISTWIDTH] IN BLOOD BY AUTOMATED COUNT: 17 % (ref 11.5–15)
GLUCOSE SERPL-MCNC: 116 MG/DL (ref 74–106)
GLUCOSE UR STRIP-MCNC: NEGATIVE MG/DL
HCT VFR BLD AUTO: 35 % (ref 39–51)
HGB BLD-MCNC: 11.5 G/DL (ref 13.5–17.5)
HGB UR QL STRIP: (no result) ERY/UL
INR PPP: 1.2 (ref 0.91–1.1)
KETONES UR STRIP-MCNC: NEGATIVE MG/DL
LEUKOCYTE ESTERASE UR QL STRIP: (no result)
LYMPHOCYTES NFR BLD AUTO: 0.8 K/UL (ref 0.8–4.8)
LYMPHOCYTES NFR BLD AUTO: 11.2 % (ref 20–44)
MCH RBC QN AUTO: 25 PG (ref 26–33)
MCHC RBC AUTO-ENTMCNC: 33 G/DL (ref 31–36)
MCV RBC AUTO: 75 FL (ref 80–96)
MONOCYTES NFR BLD AUTO: 0.5 K/UL (ref 0.1–1.3)
MONOCYTES NFR BLD AUTO: 7.4 % (ref 2–12)
NEUTROPHILS # BLD AUTO: 5.4 K/UL (ref 1.8–8.9)
NEUTROPHILS NFR BLD AUTO: 79.6 % (ref 43–81)
NITRITE UR QL STRIP: NEGATIVE
PH UR STRIP: 5.5 [PH] (ref 5–8)
PLATELET # BLD AUTO: 195 K/UL (ref 150–450)
POTASSIUM SERPL-SCNC: 4.3 MMOL/L (ref 3.5–5.1)
PROT SERPL-MCNC: 7.1 G/DL (ref 6.4–8.2)
PROT UR QL STRIP: (no result) MG/DL
PROTHROMBIN TIME: 12.6 SECS (ref 9.2–11.1)
RBC # BLD AUTO: 4.58 MIL/UL (ref 4.5–6)
RBC #/AREA URNS HPF: (no result) /HPF (ref 0–2)
SODIUM SERPL-SCNC: 134 MMOL/L (ref 136–145)
SP GR UR STRIP: 1.02 (ref 1–1.03)
UROBILINOGEN UR STRIP-MCNC: 1 EU/DL
WBC #/AREA URNS HPF: (no result) /HPF (ref 0–3)
WBC NRBC COR # BLD AUTO: 6.8 K/UL (ref 4.3–11)

## 2025-03-01 PROCEDURE — A4216 STERILE WATER/SALINE, 10 ML: HCPCS

## 2025-03-01 PROCEDURE — P9047 ALBUMIN (HUMAN), 25%, 50ML: HCPCS

## 2025-03-01 PROCEDURE — A4223 INFUSION SUPPLIES W/O PUMP: HCPCS

## 2025-03-01 PROCEDURE — G0378 HOSPITAL OBSERVATION PER HR: HCPCS

## 2025-03-01 RX ADMIN — CEFTRIAXONE ONE MLS/HR: 1 INJECTION, SOLUTION INTRAVENOUS at 15:57

## 2025-03-01 RX ADMIN — Medication ONE MLS/HR: at 19:18

## 2025-03-02 VITALS — OXYGEN SATURATION: 98 % | TEMPERATURE: 98.4 F | SYSTOLIC BLOOD PRESSURE: 139 MMHG | DIASTOLIC BLOOD PRESSURE: 89 MMHG

## 2025-03-02 VITALS — OXYGEN SATURATION: 96 % | DIASTOLIC BLOOD PRESSURE: 80 MMHG | SYSTOLIC BLOOD PRESSURE: 137 MMHG | TEMPERATURE: 98.1 F

## 2025-03-02 VITALS — SYSTOLIC BLOOD PRESSURE: 150 MMHG | OXYGEN SATURATION: 97 % | DIASTOLIC BLOOD PRESSURE: 83 MMHG | TEMPERATURE: 97.9 F

## 2025-03-02 LAB
BASOPHILS # BLD AUTO: 0 K/UL (ref 0–0.2)
BASOPHILS NFR BLD AUTO: 0.2 % (ref 0–2)
BUN SERPL-MCNC: 33 MG/DL (ref 7–18)
CALCIUM SERPL-MCNC: 8.8 MG/DL (ref 8.5–10.1)
CHLORIDE SERPL-SCNC: 103 MMOL/L (ref 98–107)
CO2 SERPL-SCNC: 22 MMOL/L (ref 21–32)
CREAT SERPL-MCNC: 1.4 MG/DL (ref 0.6–1.3)
EOSINOPHIL # BLD AUTO: 0.1 K/UL (ref 0–0.7)
EOSINOPHIL NFR BLD AUTO: 1.5 % (ref 0–6)
ERYTHROCYTE [DISTWIDTH] IN BLOOD BY AUTOMATED COUNT: 17.2 % (ref 11.5–15)
GLUCOSE SERPL-MCNC: 133 MG/DL (ref 74–106)
HCT VFR BLD AUTO: 34 % (ref 39–51)
HGB BLD-MCNC: 11 G/DL (ref 13.5–17.5)
LYMPHOCYTES NFR BLD AUTO: 0.6 K/UL (ref 0.8–4.8)
LYMPHOCYTES NFR BLD AUTO: 10.7 % (ref 20–44)
MAGNESIUM SERPL-MCNC: 1.9 MG/DL (ref 1.8–2.4)
MCH RBC QN AUTO: 25 PG (ref 26–33)
MCHC RBC AUTO-ENTMCNC: 33 G/DL (ref 31–36)
MCV RBC AUTO: 75 FL (ref 80–96)
MONOCYTES NFR BLD AUTO: 0.5 K/UL (ref 0.1–1.3)
MONOCYTES NFR BLD AUTO: 8.4 % (ref 2–12)
NEUTROPHILS # BLD AUTO: 4.8 K/UL (ref 1.8–8.9)
NEUTROPHILS NFR BLD AUTO: 79.2 % (ref 43–81)
PHOSPHATE SERPL-MCNC: 3.6 MG/DL (ref 2.5–4.9)
PLATELET # BLD AUTO: 170 K/UL (ref 150–450)
POTASSIUM SERPL-SCNC: 4.3 MMOL/L (ref 3.5–5.1)
RBC # BLD AUTO: 4.47 MIL/UL (ref 4.5–6)
SODIUM SERPL-SCNC: 135 MMOL/L (ref 136–145)
WBC NRBC COR # BLD AUTO: 6 K/UL (ref 4.3–11)

## 2025-03-02 RX ADMIN — CARVEDILOL SCH MG: 6.25 TABLET, FILM COATED ORAL at 17:48

## 2025-03-02 RX ADMIN — RIFAXIMIN SCH MG: 550 TABLET ORAL at 17:49

## 2025-03-02 RX ADMIN — PROPRANOLOL HYDROCHLORIDE SCH MG: 40 TABLET ORAL at 17:49

## 2025-03-02 RX ADMIN — DEXTROSE MONOHYDRATE SCH MLS/HR: 50 INJECTION, SOLUTION INTRAVENOUS at 16:03

## 2025-03-02 RX ADMIN — SODIUM CHLORIDE SCH MG: 9 INJECTION, SOLUTION INTRAVENOUS at 09:04

## 2025-03-03 VITALS — SYSTOLIC BLOOD PRESSURE: 122 MMHG | DIASTOLIC BLOOD PRESSURE: 61 MMHG | TEMPERATURE: 98.2 F | OXYGEN SATURATION: 100 %

## 2025-03-03 VITALS — DIASTOLIC BLOOD PRESSURE: 76 MMHG | SYSTOLIC BLOOD PRESSURE: 135 MMHG | OXYGEN SATURATION: 98 % | TEMPERATURE: 98.2 F

## 2025-03-03 VITALS — OXYGEN SATURATION: 99 % | TEMPERATURE: 98.6 F | SYSTOLIC BLOOD PRESSURE: 106 MMHG | DIASTOLIC BLOOD PRESSURE: 60 MMHG

## 2025-03-03 LAB
ALBUMIN SERPL BCP-MCNC: 2.5 G/DL (ref 3.4–5)
ALP SERPL-CCNC: 526 U/L (ref 46–116)
ALT SERPL W P-5'-P-CCNC: 64 U/L (ref 12–78)
AST SERPL W P-5'-P-CCNC: 60 U/L (ref 15–37)
BASOPHILS # BLD AUTO: 0 K/UL (ref 0–0.2)
BASOPHILS NFR BLD AUTO: 0.4 % (ref 0–2)
BILIRUB DIRECT SERPL-MCNC: 0.4 MG/DL (ref 0–0.2)
BILIRUB SERPL-MCNC: 0.7 MG/DL (ref 0.2–1)
BUN SERPL-MCNC: 35 MG/DL (ref 7–18)
CALCIUM SERPL-MCNC: 8.8 MG/DL (ref 8.5–10.1)
CHLORIDE SERPL-SCNC: 102 MMOL/L (ref 98–107)
CO2 SERPL-SCNC: 26 MMOL/L (ref 21–32)
CREAT SERPL-MCNC: 1.5 MG/DL (ref 0.6–1.3)
EOSINOPHIL # BLD AUTO: 0.1 K/UL (ref 0–0.7)
EOSINOPHIL NFR BLD AUTO: 1.6 % (ref 0–6)
ERYTHROCYTE [DISTWIDTH] IN BLOOD BY AUTOMATED COUNT: 17.1 % (ref 11.5–15)
GLUCOSE SERPL-MCNC: 132 MG/DL (ref 74–106)
HCT VFR BLD AUTO: 33 % (ref 39–51)
HGB BLD-MCNC: 11.2 G/DL (ref 13.5–17.5)
LYMPHOCYTES NFR BLD AUTO: 0.6 K/UL (ref 0.8–4.8)
LYMPHOCYTES NFR BLD AUTO: 12.3 % (ref 20–44)
MAGNESIUM SERPL-MCNC: 1.9 MG/DL (ref 1.8–2.4)
MCH RBC QN AUTO: 26 PG (ref 26–33)
MCHC RBC AUTO-ENTMCNC: 34 G/DL (ref 31–36)
MCV RBC AUTO: 75 FL (ref 80–96)
MONOCYTES NFR BLD AUTO: 0.5 K/UL (ref 0.1–1.3)
MONOCYTES NFR BLD AUTO: 9.5 % (ref 2–12)
NEUTROPHILS # BLD AUTO: 4 K/UL (ref 1.8–8.9)
NEUTROPHILS NFR BLD AUTO: 76.2 % (ref 43–81)
PHOSPHATE SERPL-MCNC: 4.1 MG/DL (ref 2.5–4.9)
PLATELET # BLD AUTO: 162 K/UL (ref 150–450)
POTASSIUM SERPL-SCNC: 4.5 MMOL/L (ref 3.5–5.1)
PROT SERPL-MCNC: 6.9 G/DL (ref 6.4–8.2)
RBC # BLD AUTO: 4.37 MIL/UL (ref 4.5–6)
SODIUM SERPL-SCNC: 134 MMOL/L (ref 136–145)
WBC NRBC COR # BLD AUTO: 5.2 K/UL (ref 4.3–11)

## 2025-03-03 PROCEDURE — 0W9G3ZX DRAINAGE OF PERITONEAL CAVITY, PERCUTANEOUS APPROACH, DIAGNOSTIC: ICD-10-PCS

## 2025-03-03 RX ADMIN — LIDOCAINE HYDROCHLORIDE ONE ML: 20 JELLY TOPICAL at 12:28

## 2025-03-03 RX ADMIN — ATORVASTATIN CALCIUM SCH MG: 40 TABLET, FILM COATED ORAL at 08:17

## 2025-03-03 RX ADMIN — LACTULOSE SCH G: 10 SOLUTION ORAL at 16:47

## 2025-03-03 RX ADMIN — TAMSULOSIN HYDROCHLORIDE SCH MG: 0.4 CAPSULE ORAL at 08:18

## 2025-03-03 RX ADMIN — PANTOPRAZOLE SODIUM SCH MG: 40 TABLET, DELAYED RELEASE ORAL at 08:17

## 2025-03-03 RX ADMIN — FERROUS SULFATE TAB 325 MG (65 MG ELEMENTAL FE) SCH MG: 325 (65 FE) TAB at 08:18

## 2025-03-03 RX ADMIN — AMLODIPINE BESYLATE SCH MG: 10 TABLET ORAL at 08:16

## 2025-03-04 VITALS — TEMPERATURE: 98.2 F | SYSTOLIC BLOOD PRESSURE: 116 MMHG | DIASTOLIC BLOOD PRESSURE: 65 MMHG | OXYGEN SATURATION: 99 %

## 2025-03-04 VITALS — TEMPERATURE: 98.2 F | DIASTOLIC BLOOD PRESSURE: 71 MMHG | SYSTOLIC BLOOD PRESSURE: 115 MMHG | OXYGEN SATURATION: 99 %

## 2025-03-04 VITALS — TEMPERATURE: 98.4 F | SYSTOLIC BLOOD PRESSURE: 119 MMHG | DIASTOLIC BLOOD PRESSURE: 74 MMHG | OXYGEN SATURATION: 99 %

## 2025-03-04 LAB
BASOPHILS # BLD AUTO: 0 K/UL (ref 0–0.2)
BASOPHILS NFR BLD AUTO: 0.2 % (ref 0–2)
BUN SERPL-MCNC: 34 MG/DL (ref 7–18)
CALCIUM SERPL-MCNC: 8.6 MG/DL (ref 8.5–10.1)
CHLORIDE SERPL-SCNC: 105 MMOL/L (ref 98–107)
CO2 SERPL-SCNC: 24 MMOL/L (ref 21–32)
CREAT SERPL-MCNC: 1.4 MG/DL (ref 0.6–1.3)
EOSINOPHIL # BLD AUTO: 0.1 K/UL (ref 0–0.7)
EOSINOPHIL NFR BLD AUTO: 1.8 % (ref 0–6)
ERYTHROCYTE [DISTWIDTH] IN BLOOD BY AUTOMATED COUNT: 16.9 % (ref 11.5–15)
GLUCOSE SERPL-MCNC: 148 MG/DL (ref 74–106)
HCT VFR BLD AUTO: 33 % (ref 39–51)
HGB BLD-MCNC: 10.9 G/DL (ref 13.5–17.5)
HIV 1+2 AB+HIV1P24 AG SERPLBLD IA.RAPID: NON REACTIVE
LYMPHOCYTES NFR BLD AUTO: 0.6 K/UL (ref 0.8–4.8)
LYMPHOCYTES NFR BLD AUTO: 12.8 % (ref 20–44)
MAGNESIUM SERPL-MCNC: 2 MG/DL (ref 1.8–2.4)
MCH RBC QN AUTO: 25 PG (ref 26–33)
MCHC RBC AUTO-ENTMCNC: 33 G/DL (ref 31–36)
MCV RBC AUTO: 75 FL (ref 80–96)
MONOCYTES NFR BLD AUTO: 0.5 K/UL (ref 0.1–1.3)
MONOCYTES NFR BLD AUTO: 9.7 % (ref 2–12)
NEUTROPHILS # BLD AUTO: 3.7 K/UL (ref 1.8–8.9)
NEUTROPHILS NFR BLD AUTO: 75.5 % (ref 43–81)
PHOSPHATE SERPL-MCNC: 3.8 MG/DL (ref 2.5–4.9)
PLATELET # BLD AUTO: 148 K/UL (ref 150–450)
POTASSIUM SERPL-SCNC: 4.6 MMOL/L (ref 3.5–5.1)
RBC # BLD AUTO: 4.41 MIL/UL (ref 4.5–6)
SODIUM SERPL-SCNC: 139 MMOL/L (ref 136–145)
WBC NRBC COR # BLD AUTO: 4.9 K/UL (ref 4.3–11)

## 2025-03-04 RX ADMIN — DEXTROSE MONOHYDRATE SCH MLS/HR: 50 INJECTION, SOLUTION INTRAVENOUS at 15:30

## 2025-03-05 VITALS — SYSTOLIC BLOOD PRESSURE: 132 MMHG | TEMPERATURE: 97.3 F | OXYGEN SATURATION: 98 % | DIASTOLIC BLOOD PRESSURE: 73 MMHG

## 2025-03-05 VITALS — OXYGEN SATURATION: 99 % | TEMPERATURE: 98.4 F | SYSTOLIC BLOOD PRESSURE: 129 MMHG | DIASTOLIC BLOOD PRESSURE: 75 MMHG

## 2025-03-05 VITALS — TEMPERATURE: 97.5 F | DIASTOLIC BLOOD PRESSURE: 74 MMHG | OXYGEN SATURATION: 97 % | SYSTOLIC BLOOD PRESSURE: 126 MMHG

## 2025-03-05 LAB
ALBUMIN SERPL BCP-MCNC: 2.3 G/DL (ref 3.4–5)
ALP SERPL-CCNC: 491 U/L (ref 46–116)
ALT SERPL W P-5'-P-CCNC: 65 U/L (ref 12–78)
AMMONIA PLAS-SCNC: 19 UMOL/L (ref 11–32)
ANISOCYTOSIS BLD QL: (no result)
AST SERPL W P-5'-P-CCNC: 56 U/L (ref 15–37)
BASOPHILS # BLD AUTO: 0 K/UL (ref 0–0.2)
BASOPHILS NFR BLD AUTO: 0.5 % (ref 0–2)
BILIRUB SERPL-MCNC: 0.5 MG/DL (ref 0.2–1)
BUN SERPL-MCNC: 38 MG/DL (ref 7–18)
CALCIUM SERPL-MCNC: 8.6 MG/DL (ref 8.5–10.1)
CHLORIDE SERPL-SCNC: 102 MMOL/L (ref 98–107)
CO2 SERPL-SCNC: 25 MMOL/L (ref 21–32)
CREAT SERPL-MCNC: 1.6 MG/DL (ref 0.6–1.3)
EOSINOPHIL # BLD AUTO: 0.1 K/UL (ref 0–0.7)
EOSINOPHIL NFR BLD AUTO: 1.5 % (ref 0–6)
ERYTHROCYTE [DISTWIDTH] IN BLOOD BY AUTOMATED COUNT: 16.9 % (ref 11.5–15)
GLUCOSE SERPL-MCNC: 134 MG/DL (ref 74–106)
HCT VFR BLD AUTO: 34 % (ref 39–51)
HGB BLD-MCNC: 11.3 G/DL (ref 13.5–17.5)
LYMPHOCYTES NFR BLD AUTO: 0.6 K/UL (ref 0.8–4.8)
LYMPHOCYTES NFR BLD AUTO: 13 % (ref 20–44)
LYMPHOCYTES NFR BLD MANUAL: 14 % (ref 16–48)
MCH RBC QN AUTO: 25 PG (ref 26–33)
MCHC RBC AUTO-ENTMCNC: 34 G/DL (ref 31–36)
MCV RBC AUTO: 75 FL (ref 80–96)
MONOCYTES NFR BLD AUTO: 0.4 K/UL (ref 0.1–1.3)
MONOCYTES NFR BLD AUTO: 8.4 % (ref 2–12)
MONOCYTES NFR BLD MANUAL: 9 % (ref 0–11)
NEUTROPHILS # BLD AUTO: 3.4 K/UL (ref 1.8–8.9)
NEUTROPHILS NFR BLD AUTO: 76.6 % (ref 43–81)
NEUTS BAND NFR BLD MANUAL: 2 % (ref 0–5)
NEUTS SEG NFR BLD MANUAL: 75 % (ref 42–76)
PLATELET # BLD AUTO: 160 K/UL (ref 150–450)
PLATELET BLD QL SMEAR: ADEQUATE
POTASSIUM SERPL-SCNC: 4.8 MMOL/L (ref 3.5–5.1)
PROT SERPL-MCNC: 6.6 G/DL (ref 6.4–8.2)
RBC # BLD AUTO: 4.48 MIL/UL (ref 4.5–6)
SODIUM SERPL-SCNC: 133 MMOL/L (ref 136–145)
WBC NRBC COR # BLD AUTO: 4.5 K/UL (ref 4.3–11)

## 2025-03-06 VITALS — SYSTOLIC BLOOD PRESSURE: 140 MMHG | OXYGEN SATURATION: 98 % | DIASTOLIC BLOOD PRESSURE: 72 MMHG | TEMPERATURE: 98.2 F

## 2025-03-06 RX ADMIN — FLUCONAZOLE SCH MG: 100 TABLET ORAL at 13:08

## 2025-03-20 ENCOUNTER — HOSPITAL ENCOUNTER (INPATIENT)
Dept: HOSPITAL 54 - ER | Age: 75
LOS: 3 days | Discharge: NURSING, REHAB OR LONG-TERM CARE | DRG: 432 | End: 2025-03-23
Attending: NURSE PRACTITIONER | Admitting: NURSE PRACTITIONER
Payer: MEDICARE

## 2025-03-20 VITALS — TEMPERATURE: 97.9 F | DIASTOLIC BLOOD PRESSURE: 66 MMHG | OXYGEN SATURATION: 98 % | SYSTOLIC BLOOD PRESSURE: 100 MMHG

## 2025-03-20 VITALS — HEIGHT: 65 IN | WEIGHT: 163 LBS | BODY MASS INDEX: 27.16 KG/M2

## 2025-03-20 VITALS — SYSTOLIC BLOOD PRESSURE: 101 MMHG | OXYGEN SATURATION: 96 % | TEMPERATURE: 99.1 F | DIASTOLIC BLOOD PRESSURE: 63 MMHG

## 2025-03-20 DIAGNOSIS — K76.82: ICD-10-CM

## 2025-03-20 DIAGNOSIS — D50.9: ICD-10-CM

## 2025-03-20 DIAGNOSIS — I13.0: ICD-10-CM

## 2025-03-20 DIAGNOSIS — I50.9: ICD-10-CM

## 2025-03-20 DIAGNOSIS — R18.8: ICD-10-CM

## 2025-03-20 DIAGNOSIS — E78.5: ICD-10-CM

## 2025-03-20 DIAGNOSIS — E83.42: ICD-10-CM

## 2025-03-20 DIAGNOSIS — E11.51: ICD-10-CM

## 2025-03-20 DIAGNOSIS — N39.0: ICD-10-CM

## 2025-03-20 DIAGNOSIS — M19.90: ICD-10-CM

## 2025-03-20 DIAGNOSIS — N17.9: ICD-10-CM

## 2025-03-20 DIAGNOSIS — Z95.5: ICD-10-CM

## 2025-03-20 DIAGNOSIS — B96.89: ICD-10-CM

## 2025-03-20 DIAGNOSIS — K72.10: ICD-10-CM

## 2025-03-20 DIAGNOSIS — D68.59: ICD-10-CM

## 2025-03-20 DIAGNOSIS — Z79.4: ICD-10-CM

## 2025-03-20 DIAGNOSIS — M89.8X9: ICD-10-CM

## 2025-03-20 DIAGNOSIS — E87.5: ICD-10-CM

## 2025-03-20 DIAGNOSIS — Z66: ICD-10-CM

## 2025-03-20 DIAGNOSIS — G93.41: ICD-10-CM

## 2025-03-20 DIAGNOSIS — R53.1: ICD-10-CM

## 2025-03-20 DIAGNOSIS — Z79.84: ICD-10-CM

## 2025-03-20 DIAGNOSIS — E87.20: ICD-10-CM

## 2025-03-20 DIAGNOSIS — Z98.890: ICD-10-CM

## 2025-03-20 DIAGNOSIS — Z74.09: ICD-10-CM

## 2025-03-20 DIAGNOSIS — Z86.16: ICD-10-CM

## 2025-03-20 DIAGNOSIS — E87.1: ICD-10-CM

## 2025-03-20 DIAGNOSIS — M81.0: ICD-10-CM

## 2025-03-20 DIAGNOSIS — H40.9: ICD-10-CM

## 2025-03-20 DIAGNOSIS — Z79.899: ICD-10-CM

## 2025-03-20 DIAGNOSIS — E11.22: ICD-10-CM

## 2025-03-20 DIAGNOSIS — K74.60: Primary | ICD-10-CM

## 2025-03-20 DIAGNOSIS — Z79.02: ICD-10-CM

## 2025-03-20 DIAGNOSIS — Z51.5: ICD-10-CM

## 2025-03-20 DIAGNOSIS — I25.10: ICD-10-CM

## 2025-03-20 DIAGNOSIS — N18.4: ICD-10-CM

## 2025-03-20 DIAGNOSIS — Z86.73: ICD-10-CM

## 2025-03-20 DIAGNOSIS — K76.6: ICD-10-CM

## 2025-03-20 LAB
ALBUMIN SERPL BCP-MCNC: 2.6 G/DL (ref 3.4–5)
ALP SERPL-CCNC: 387 U/L (ref 46–116)
ALT SERPL W P-5'-P-CCNC: 25 U/L (ref 12–78)
AMMONIA PLAS-SCNC: 27 UMOL/L (ref 11–32)
APTT PPP: 29.9 SEC (ref 24.3–34.3)
AST SERPL W P-5'-P-CCNC: 32 U/L (ref 15–37)
BASOPHILS # BLD AUTO: 0 K/UL (ref 0–0.2)
BASOPHILS NFR BLD AUTO: 0.5 % (ref 0–2)
BILIRUB DIRECT SERPL-MCNC: 0.4 MG/DL (ref 0–0.2)
BILIRUB SERPL-MCNC: 0.7 MG/DL (ref 0.2–1)
BUN SERPL-MCNC: 55 MG/DL (ref 7–18)
CALCIUM SERPL-MCNC: 8.9 MG/DL (ref 8.5–10.1)
CHLORIDE SERPL-SCNC: 101 MMOL/L (ref 98–107)
CO2 SERPL-SCNC: 21 MMOL/L (ref 21–32)
CREAT SERPL-MCNC: 1.9 MG/DL (ref 0.6–1.3)
EOSINOPHIL # BLD AUTO: 0.1 K/UL (ref 0–0.7)
EOSINOPHIL NFR BLD AUTO: 1.3 % (ref 0–6)
ERYTHROCYTE [DISTWIDTH] IN BLOOD BY AUTOMATED COUNT: 18.2 % (ref 11.5–15)
GLUCOSE SERPL-MCNC: 108 MG/DL (ref 74–106)
HCT VFR BLD AUTO: 36 % (ref 39–51)
HGB BLD-MCNC: 12 G/DL (ref 13.5–17.5)
INR PPP: 1.14 (ref 0.91–1.1)
LACTATE SERPL-SCNC: 2 MMOL/L (ref 0.4–2)
LYMPHOCYTES NFR BLD AUTO: 1.2 K/UL (ref 0.8–4.8)
LYMPHOCYTES NFR BLD AUTO: 14.2 % (ref 20–44)
MCH RBC QN AUTO: 25 PG (ref 26–33)
MCHC RBC AUTO-ENTMCNC: 33 G/DL (ref 31–36)
MCV RBC AUTO: 75 FL (ref 80–96)
MONOCYTES NFR BLD AUTO: 0.6 K/UL (ref 0.1–1.3)
MONOCYTES NFR BLD AUTO: 7.5 % (ref 2–12)
NEUTROPHILS # BLD AUTO: 6.5 K/UL (ref 1.8–8.9)
NEUTROPHILS NFR BLD AUTO: 76.5 % (ref 43–81)
PLATELET # BLD AUTO: 151 K/UL (ref 150–450)
POTASSIUM SERPL-SCNC: 5 MMOL/L (ref 3.5–5.1)
PROT SERPL-MCNC: 7 G/DL (ref 6.4–8.2)
PROTHROMBIN TIME: 12 SECS (ref 9.2–11.1)
RBC # BLD AUTO: 4.84 MIL/UL (ref 4.5–6)
SODIUM SERPL-SCNC: 133 MMOL/L (ref 136–145)
WBC NRBC COR # BLD AUTO: 8.5 K/UL (ref 4.3–11)

## 2025-03-20 PROCEDURE — A4216 STERILE WATER/SALINE, 10 ML: HCPCS

## 2025-03-20 PROCEDURE — G0378 HOSPITAL OBSERVATION PER HR: HCPCS

## 2025-03-20 PROCEDURE — P9047 ALBUMIN (HUMAN), 25%, 50ML: HCPCS

## 2025-03-20 PROCEDURE — A4223 INFUSION SUPPLIES W/O PUMP: HCPCS

## 2025-03-20 RX ADMIN — Medication SCH EACH: at 18:10

## 2025-03-20 RX ADMIN — INSULIN GLARGINE SCH UNIT: 100 INJECTION, SOLUTION SUBCUTANEOUS at 22:00

## 2025-03-20 RX ADMIN — CARVEDILOL SCH MG: 6.25 TABLET, FILM COATED ORAL at 17:57

## 2025-03-20 RX ADMIN — RIFAXIMIN SCH MG: 550 TABLET ORAL at 17:57

## 2025-03-20 RX ADMIN — SODIUM CHLORIDE ONE ML: 9 INJECTION, SOLUTION INTRAVENOUS at 13:00

## 2025-03-20 RX ADMIN — TAMSULOSIN HYDROCHLORIDE SCH MG: 0.4 CAPSULE ORAL at 22:00

## 2025-03-20 RX ADMIN — Medication ONE MG: at 15:00

## 2025-03-20 RX ADMIN — DEXTROSE MONOHYDRATE ONE MG: 50 INJECTION, SOLUTION INTRAVENOUS at 15:00

## 2025-03-20 RX ADMIN — ATORVASTATIN CALCIUM SCH MG: 40 TABLET, FILM COATED ORAL at 22:00

## 2025-03-21 VITALS — DIASTOLIC BLOOD PRESSURE: 60 MMHG | OXYGEN SATURATION: 98 % | TEMPERATURE: 98.2 F | SYSTOLIC BLOOD PRESSURE: 110 MMHG

## 2025-03-21 VITALS — DIASTOLIC BLOOD PRESSURE: 49 MMHG | OXYGEN SATURATION: 97 % | TEMPERATURE: 98.3 F | SYSTOLIC BLOOD PRESSURE: 95 MMHG

## 2025-03-21 VITALS — SYSTOLIC BLOOD PRESSURE: 95 MMHG | OXYGEN SATURATION: 95 % | DIASTOLIC BLOOD PRESSURE: 41 MMHG | TEMPERATURE: 98.1 F

## 2025-03-21 VITALS — DIASTOLIC BLOOD PRESSURE: 60 MMHG | SYSTOLIC BLOOD PRESSURE: 110 MMHG | TEMPERATURE: 98.2 F | OXYGEN SATURATION: 98 %

## 2025-03-21 LAB
APPEARANCE UR: (no result)
BACTERIA UR CULT: YES
BASOPHILS # BLD AUTO: 0 K/UL (ref 0–0.2)
BASOPHILS NFR BLD AUTO: 0.4 % (ref 0–2)
BILIRUB UR QL STRIP: NEGATIVE
BUN SERPL-MCNC: 56 MG/DL (ref 7–18)
BUN SERPL-MCNC: 56 MG/DL (ref 7–18)
CALCIUM SERPL-MCNC: 8.5 MG/DL (ref 8.5–10.1)
CALCIUM SERPL-MCNC: 8.5 MG/DL (ref 8.5–10.1)
CHLORIDE SERPL-SCNC: 100 MMOL/L (ref 98–107)
CHLORIDE SERPL-SCNC: 103 MMOL/L (ref 98–107)
CO2 SERPL-SCNC: 19 MMOL/L (ref 21–32)
CO2 SERPL-SCNC: 23 MMOL/L (ref 21–32)
COLOR UR: YELLOW
CREAT SERPL-MCNC: 2 MG/DL (ref 0.6–1.3)
CREAT SERPL-MCNC: 2 MG/DL (ref 0.6–1.3)
CREAT UR-MCNC: 63.7 MG/DL (ref 30–125)
DEPRECATED SQUAMOUS URNS QL MICRO: (no result) /HPF
EOSINOPHIL # BLD AUTO: 0.1 K/UL (ref 0–0.7)
EOSINOPHIL NFR BLD AUTO: 1.8 % (ref 0–6)
ERYTHROCYTE [DISTWIDTH] IN BLOOD BY AUTOMATED COUNT: 18.1 % (ref 11.5–15)
GLUCOSE SERPL-MCNC: 108 MG/DL (ref 74–106)
GLUCOSE SERPL-MCNC: 79 MG/DL (ref 74–106)
GLUCOSE UR STRIP-MCNC: NEGATIVE MG/DL
HCT VFR BLD AUTO: 35 % (ref 39–51)
HGB BLD-MCNC: 11.4 G/DL (ref 13.5–17.5)
HGB UR QL STRIP: (no result) ERY/UL
KETONES UR STRIP-MCNC: NEGATIVE MG/DL
LEUKOCYTE ESTERASE UR QL STRIP: (no result)
LYMPHOCYTES NFR BLD AUTO: 1.3 K/UL (ref 0.8–4.8)
LYMPHOCYTES NFR BLD AUTO: 20.3 % (ref 20–44)
MAGNESIUM SERPL-MCNC: 1.6 MG/DL (ref 1.8–2.4)
MCH RBC QN AUTO: 25 PG (ref 26–33)
MCHC RBC AUTO-ENTMCNC: 33 G/DL (ref 31–36)
MCV RBC AUTO: 76 FL (ref 80–96)
MONOCYTES NFR BLD AUTO: 0.5 K/UL (ref 0.1–1.3)
MONOCYTES NFR BLD AUTO: 8.7 % (ref 2–12)
NEUTROPHILS # BLD AUTO: 4.3 K/UL (ref 1.8–8.9)
NEUTROPHILS NFR BLD AUTO: 68.8 % (ref 43–81)
NITRITE UR QL STRIP: NEGATIVE
PH UR STRIP: 5.5 [PH] (ref 5–8)
PHOSPHATE SERPL-MCNC: 5 MG/DL (ref 2.5–4.9)
PLATELET # BLD AUTO: 127 K/UL (ref 150–450)
POTASSIUM SERPL-SCNC: 4.6 MMOL/L (ref 3.5–5.1)
POTASSIUM SERPL-SCNC: 5.2 MMOL/L (ref 3.5–5.1)
PROT UR QL STRIP: (no result) MG/DL
PROT UR-MCNC: 168.7 MG/DL (ref 0–11.9)
RBC # BLD AUTO: 4.61 MIL/UL (ref 4.5–6)
RBC #/AREA URNS HPF: (no result) /HPF (ref 0–2)
SODIUM SERPL-SCNC: 131 MMOL/L (ref 136–145)
SODIUM SERPL-SCNC: 132 MMOL/L (ref 136–145)
SODIUM UR-SCNC: 64 MMOL/L (ref 40–220)
SP GR UR STRIP: 1.02 (ref 1–1.03)
UROBILINOGEN UR STRIP-MCNC: 0.2 EU/DL
WBC #/AREA URNS HPF: (no result) /HPF (ref 0–3)
WBC NRBC COR # BLD AUTO: 6.2 K/UL (ref 4.3–11)

## 2025-03-21 PROCEDURE — 0W9G3ZZ DRAINAGE OF PERITONEAL CAVITY, PERCUTANEOUS APPROACH: ICD-10-PCS

## 2025-03-21 RX ADMIN — PANTOPRAZOLE SODIUM SCH MG: 40 TABLET, DELAYED RELEASE ORAL at 09:00

## 2025-03-21 RX ADMIN — Medication SCH MLS/HR: at 18:48

## 2025-03-21 RX ADMIN — AMLODIPINE BESYLATE SCH MG: 10 TABLET ORAL at 09:00

## 2025-03-21 RX ADMIN — INSULIN HUMAN PRN UNITS: 100 INJECTION, SOLUTION PARENTERAL at 21:27

## 2025-03-21 RX ADMIN — THERA TABS SCH UDTAB: TAB at 09:00

## 2025-03-21 RX ADMIN — OXYCODONE HYDROCHLORIDE AND ACETAMINOPHEN SCH MG: 500 TABLET ORAL at 09:00

## 2025-03-21 RX ADMIN — LACTOBACILLUS TAB SCH EACH: TAB at 09:00

## 2025-03-21 RX ADMIN — MAGNESIUM SULFATE IN DEXTROSE SCH MLS/HR: 10 INJECTION, SOLUTION INTRAVENOUS at 09:50

## 2025-03-21 RX ADMIN — FERROUS SULFATE TAB 325 MG (65 MG ELEMENTAL FE) SCH MG: 325 (65 FE) TAB at 09:00

## 2025-03-21 RX ADMIN — ACETAMINOPHEN SCH MG: 325 TABLET ORAL at 09:00

## 2025-03-21 RX ADMIN — POVIDONE-IODINE SCH APPLIC: 100 OINTMENT TOPICAL at 09:13

## 2025-03-21 RX ADMIN — PANTOPRAZOLE SODIUM SCH MG: 40 TABLET, DELAYED RELEASE ORAL at 07:30

## 2025-03-21 RX ADMIN — Medication PRN MLS/HR: at 13:24

## 2025-03-21 RX ADMIN — Medication SCH MG: at 09:00

## 2025-03-21 RX ADMIN — CLOPIDOGREL BISULFATE SCH MG: 75 TABLET, FILM COATED ORAL at 09:00

## 2025-03-21 RX ADMIN — Medication SCH APPLIC: at 09:13

## 2025-03-22 VITALS — TEMPERATURE: 97.9 F | OXYGEN SATURATION: 98 % | SYSTOLIC BLOOD PRESSURE: 111 MMHG | DIASTOLIC BLOOD PRESSURE: 66 MMHG

## 2025-03-22 VITALS — OXYGEN SATURATION: 99 % | DIASTOLIC BLOOD PRESSURE: 66 MMHG | SYSTOLIC BLOOD PRESSURE: 120 MMHG | TEMPERATURE: 98.2 F

## 2025-03-22 VITALS — TEMPERATURE: 97.3 F | DIASTOLIC BLOOD PRESSURE: 48 MMHG | OXYGEN SATURATION: 99 % | SYSTOLIC BLOOD PRESSURE: 98 MMHG

## 2025-03-22 VITALS — OXYGEN SATURATION: 99 % | DIASTOLIC BLOOD PRESSURE: 53 MMHG | SYSTOLIC BLOOD PRESSURE: 109 MMHG | TEMPERATURE: 97.7 F

## 2025-03-22 VITALS — TEMPERATURE: 98.2 F | DIASTOLIC BLOOD PRESSURE: 60 MMHG | SYSTOLIC BLOOD PRESSURE: 120 MMHG | OXYGEN SATURATION: 99 %

## 2025-03-22 VITALS — SYSTOLIC BLOOD PRESSURE: 109 MMHG | TEMPERATURE: 97.7 F | DIASTOLIC BLOOD PRESSURE: 53 MMHG | OXYGEN SATURATION: 99 %

## 2025-03-22 LAB
ALBUMIN SERPL BCP-MCNC: 2.9 G/DL (ref 3.4–5)
ALP SERPL-CCNC: 304 U/L (ref 46–116)
ALT SERPL W P-5'-P-CCNC: 23 U/L (ref 12–78)
AST SERPL W P-5'-P-CCNC: 25 U/L (ref 15–37)
BASOPHILS # BLD AUTO: 0 K/UL (ref 0–0.2)
BASOPHILS NFR BLD AUTO: 0.1 % (ref 0–2)
BILIRUB SERPL-MCNC: 0.7 MG/DL (ref 0.2–1)
BUN SERPL-MCNC: 53 MG/DL (ref 7–18)
CALCIUM SERPL-MCNC: 8.2 MG/DL (ref 8.5–10.1)
CHLORIDE SERPL-SCNC: 102 MMOL/L (ref 98–107)
CK SERPL-CCNC: 35 U/L (ref 39–308)
CO2 SERPL-SCNC: 22 MMOL/L (ref 21–32)
CREAT SERPL-MCNC: 2 MG/DL (ref 0.6–1.3)
EOSINOPHIL # BLD AUTO: 0.1 K/UL (ref 0–0.7)
EOSINOPHIL NFR BLD AUTO: 1.5 % (ref 0–6)
ERYTHROCYTE [DISTWIDTH] IN BLOOD BY AUTOMATED COUNT: 18.3 % (ref 11.5–15)
GLUCOSE SERPL-MCNC: 105 MG/DL (ref 74–106)
HCT VFR BLD AUTO: 30 % (ref 39–51)
HGB BLD-MCNC: 10.3 G/DL (ref 13.5–17.5)
LYMPHOCYTES NFR BLD AUTO: 0.9 K/UL (ref 0.8–4.8)
LYMPHOCYTES NFR BLD AUTO: 16.2 % (ref 20–44)
MAGNESIUM SERPL-MCNC: 1.8 MG/DL (ref 1.8–2.4)
MCH RBC QN AUTO: 26 PG (ref 26–33)
MCHC RBC AUTO-ENTMCNC: 34 G/DL (ref 31–36)
MCV RBC AUTO: 75 FL (ref 80–96)
MONOCYTES NFR BLD AUTO: 0.5 K/UL (ref 0.1–1.3)
MONOCYTES NFR BLD AUTO: 8.9 % (ref 2–12)
NEUTROPHILS # BLD AUTO: 3.9 K/UL (ref 1.8–8.9)
NEUTROPHILS NFR BLD AUTO: 73.3 % (ref 43–81)
PHOSPHATE SERPL-MCNC: 4.2 MG/DL (ref 2.5–4.9)
PLATELET # BLD AUTO: 108 K/UL (ref 150–450)
POTASSIUM SERPL-SCNC: 4.4 MMOL/L (ref 3.5–5.1)
PROT SERPL-MCNC: 6.2 G/DL (ref 6.4–8.2)
RBC # BLD AUTO: 4.02 MIL/UL (ref 4.5–6)
SODIUM SERPL-SCNC: 133 MMOL/L (ref 136–145)
WBC NRBC COR # BLD AUTO: 5.3 K/UL (ref 4.3–11)

## 2025-03-22 RX ADMIN — DEXTROSE MONOHYDRATE SCH MLS/HR: 50 INJECTION, SOLUTION INTRAVENOUS at 10:42

## 2025-03-23 VITALS — OXYGEN SATURATION: 96 % | SYSTOLIC BLOOD PRESSURE: 115 MMHG | DIASTOLIC BLOOD PRESSURE: 74 MMHG | TEMPERATURE: 97.7 F

## 2025-03-23 VITALS — SYSTOLIC BLOOD PRESSURE: 113 MMHG | TEMPERATURE: 98.1 F | OXYGEN SATURATION: 99 % | DIASTOLIC BLOOD PRESSURE: 72 MMHG

## 2025-03-23 VITALS — DIASTOLIC BLOOD PRESSURE: 65 MMHG | TEMPERATURE: 97.9 F | OXYGEN SATURATION: 98 % | SYSTOLIC BLOOD PRESSURE: 121 MMHG

## 2025-03-23 LAB
ALBUMIN SERPL BCP-MCNC: 3.1 G/DL (ref 3.4–5)
ALP SERPL-CCNC: 295 U/L (ref 46–116)
ALT SERPL W P-5'-P-CCNC: 29 U/L (ref 12–78)
AST SERPL W P-5'-P-CCNC: 35 U/L (ref 15–37)
BASOPHILS # BLD AUTO: 0 K/UL (ref 0–0.2)
BASOPHILS NFR BLD AUTO: 0.3 % (ref 0–2)
BILIRUB SERPL-MCNC: 0.5 MG/DL (ref 0.2–1)
BUN SERPL-MCNC: 54 MG/DL (ref 7–18)
CALCIUM SERPL-MCNC: 8.4 MG/DL (ref 8.5–10.1)
CHLORIDE SERPL-SCNC: 102 MMOL/L (ref 98–107)
CO2 SERPL-SCNC: 19 MMOL/L (ref 21–32)
CREAT SERPL-MCNC: 1.9 MG/DL (ref 0.6–1.3)
EOSINOPHIL # BLD AUTO: 0.1 K/UL (ref 0–0.7)
EOSINOPHIL NFR BLD AUTO: 1.4 % (ref 0–6)
ERYTHROCYTE [DISTWIDTH] IN BLOOD BY AUTOMATED COUNT: 18 % (ref 11.5–15)
GLUCOSE SERPL-MCNC: 111 MG/DL (ref 74–106)
HCT VFR BLD AUTO: 31 % (ref 39–51)
HGB BLD-MCNC: 10.3 G/DL (ref 13.5–17.5)
LYMPHOCYTES NFR BLD AUTO: 0.7 K/UL (ref 0.8–4.8)
LYMPHOCYTES NFR BLD AUTO: 16.2 % (ref 20–44)
MAGNESIUM SERPL-MCNC: 2 MG/DL (ref 1.8–2.4)
MCH RBC QN AUTO: 25 PG (ref 26–33)
MCHC RBC AUTO-ENTMCNC: 34 G/DL (ref 31–36)
MCV RBC AUTO: 75 FL (ref 80–96)
MONOCYTES NFR BLD AUTO: 0.4 K/UL (ref 0.1–1.3)
MONOCYTES NFR BLD AUTO: 9.1 % (ref 2–12)
NEUTROPHILS # BLD AUTO: 3.2 K/UL (ref 1.8–8.9)
NEUTROPHILS NFR BLD AUTO: 73 % (ref 43–81)
PHOSPHATE SERPL-MCNC: 3.4 MG/DL (ref 2.5–4.9)
PLATELET # BLD AUTO: 88 K/UL (ref 150–450)
POTASSIUM SERPL-SCNC: 4.3 MMOL/L (ref 3.5–5.1)
PROT SERPL-MCNC: 6.3 G/DL (ref 6.4–8.2)
RBC # BLD AUTO: 4.09 MIL/UL (ref 4.5–6)
SODIUM SERPL-SCNC: 132 MMOL/L (ref 136–145)
TSH SERPL DL<=0.005 MIU/L-ACNC: 2.71 UIU/ML (ref 0.36–3.74)
VIT B12 SERPL-MCNC: 319 PG/ML (ref 193–986)
WBC NRBC COR # BLD AUTO: 4.3 K/UL (ref 4.3–11)

## 2025-03-23 RX ADMIN — Medication PRN OZ: at 10:21

## 2025-03-23 RX ADMIN — PANTOPRAZOLE SODIUM SCH MG: 40 TABLET, DELAYED RELEASE ORAL at 10:18

## 2025-03-23 RX ADMIN — Medication SCH MCG: at 10:20

## 2025-03-24 LAB — PTH-INTACT SERPL-MCNC: 25 PG/ML (ref 15–65)
